# Patient Record
Sex: FEMALE | Race: WHITE | Employment: UNEMPLOYED | URBAN - METROPOLITAN AREA
[De-identification: names, ages, dates, MRNs, and addresses within clinical notes are randomized per-mention and may not be internally consistent; named-entity substitution may affect disease eponyms.]

---

## 2019-05-19 ENCOUNTER — HOSPITAL ENCOUNTER (INPATIENT)
Age: 54
LOS: 6 days | Discharge: HOME | DRG: 193 | End: 2019-05-25
Attending: EMERGENCY MEDICINE | Admitting: FAMILY MEDICINE
Payer: COMMERCIAL

## 2019-05-19 ENCOUNTER — APPOINTMENT (OUTPATIENT)
Dept: GENERAL RADIOLOGY | Age: 54
DRG: 193 | End: 2019-05-19
Attending: EMERGENCY MEDICINE
Payer: COMMERCIAL

## 2019-05-19 DIAGNOSIS — Z87.891 PERSONAL HISTORY OF TOBACCO USE, PRESENTING HAZARDS TO HEALTH: ICD-10-CM

## 2019-05-19 DIAGNOSIS — J96.01 ACUTE RESPIRATORY FAILURE WITH HYPOXIA (HCC): ICD-10-CM

## 2019-05-19 DIAGNOSIS — F41.9 ANXIETY: ICD-10-CM

## 2019-05-19 DIAGNOSIS — J16.0 PNEUMONIA OF RIGHT LOWER LOBE DUE TO CHLAMYDIA SPECIES: ICD-10-CM

## 2019-05-19 DIAGNOSIS — J44.1 COPD WITH ACUTE EXACERBATION (HCC): ICD-10-CM

## 2019-05-19 DIAGNOSIS — J18.9 PNEUMONIA OF RIGHT LOWER LOBE DUE TO INFECTIOUS ORGANISM: ICD-10-CM

## 2019-05-19 DIAGNOSIS — J44.1 COPD EXACERBATION (HCC): ICD-10-CM

## 2019-05-19 DIAGNOSIS — F17.210 CIGARETTE NICOTINE DEPENDENCE WITHOUT COMPLICATION: Chronic | ICD-10-CM

## 2019-05-19 DIAGNOSIS — J18.9 COMMUNITY ACQUIRED PNEUMONIA OF RIGHT LOWER LOBE OF LUNG: Primary | ICD-10-CM

## 2019-05-19 PROBLEM — L40.50 PSORIASIS WITH ARTHROPATHY (HCC): Status: ACTIVE | Noted: 2019-05-19

## 2019-05-19 PROBLEM — E03.9 HYPOTHYROID: Status: ACTIVE | Noted: 2019-05-19

## 2019-05-19 PROBLEM — J96.91 RESPIRATORY FAILURE WITH HYPOXIA (HCC): Status: ACTIVE | Noted: 2019-05-19

## 2019-05-19 PROBLEM — E66.9 OBESITY: Status: ACTIVE | Noted: 2019-05-19

## 2019-05-19 LAB
ALBUMIN SERPL-MCNC: 3.8 G/DL (ref 3.5–5)
ALBUMIN/GLOB SERPL: 0.9 {RATIO} (ref 1.2–3.5)
ALP SERPL-CCNC: 99 U/L (ref 50–136)
ALT SERPL-CCNC: 22 U/L (ref 12–65)
ANION GAP SERPL CALC-SCNC: 6 MMOL/L (ref 7–16)
AST SERPL-CCNC: 15 U/L (ref 15–37)
ATRIAL RATE: 110 BPM
BASOPHILS # BLD: 0.1 K/UL (ref 0–0.2)
BASOPHILS NFR BLD: 1 % (ref 0–2)
BILIRUB SERPL-MCNC: 0.4 MG/DL (ref 0.2–1.1)
BNP SERPL-MCNC: 13 PG/ML
BUN SERPL-MCNC: 9 MG/DL (ref 6–23)
CALCIUM SERPL-MCNC: 9 MG/DL (ref 8.3–10.4)
CALCULATED P AXIS, ECG09: 87 DEGREES
CALCULATED R AXIS, ECG10: 99 DEGREES
CALCULATED T AXIS, ECG11: 86 DEGREES
CHLORIDE SERPL-SCNC: 101 MMOL/L (ref 98–107)
CO2 SERPL-SCNC: 32 MMOL/L (ref 21–32)
CREAT SERPL-MCNC: 0.59 MG/DL (ref 0.6–1)
DIAGNOSIS, 93000: NORMAL
DIFFERENTIAL METHOD BLD: ABNORMAL
EOSINOPHIL # BLD: 0.1 K/UL (ref 0–0.8)
EOSINOPHIL NFR BLD: 1 % (ref 0.5–7.8)
ERYTHROCYTE [DISTWIDTH] IN BLOOD BY AUTOMATED COUNT: 15.8 % (ref 11.9–14.6)
GLOBULIN SER CALC-MCNC: 4.2 G/DL (ref 2.3–3.5)
GLUCOSE SERPL-MCNC: 95 MG/DL (ref 65–100)
HCT VFR BLD AUTO: 45.9 % (ref 35.8–46.3)
HGB BLD-MCNC: 14.5 G/DL (ref 11.7–15.4)
IMM GRANULOCYTES # BLD AUTO: 0 K/UL (ref 0–0.5)
IMM GRANULOCYTES NFR BLD AUTO: 1 % (ref 0–5)
LACTATE BLD-SCNC: 0.87 MMOL/L (ref 0.5–1.9)
LYMPHOCYTES # BLD: 1 K/UL (ref 0.5–4.6)
LYMPHOCYTES NFR BLD: 12 % (ref 13–44)
MCH RBC QN AUTO: 30.4 PG (ref 26.1–32.9)
MCHC RBC AUTO-ENTMCNC: 31.6 G/DL (ref 31.4–35)
MCV RBC AUTO: 96.2 FL (ref 79.6–97.8)
MONOCYTES # BLD: 0.7 K/UL (ref 0.1–1.3)
MONOCYTES NFR BLD: 8 % (ref 4–12)
NEUTS SEG # BLD: 6.7 K/UL (ref 1.7–8.2)
NEUTS SEG NFR BLD: 78 % (ref 43–78)
NRBC # BLD: 0 K/UL (ref 0–0.2)
P-R INTERVAL, ECG05: 116 MS
PLATELET # BLD AUTO: 232 K/UL (ref 150–450)
PMV BLD AUTO: 9.5 FL (ref 9.4–12.3)
POTASSIUM SERPL-SCNC: 4.1 MMOL/L (ref 3.5–5.1)
PROT SERPL-MCNC: 8 G/DL (ref 6.3–8.2)
Q-T INTERVAL, ECG07: 320 MS
QRS DURATION, ECG06: 76 MS
QTC CALCULATION (BEZET), ECG08: 433 MS
RBC # BLD AUTO: 4.77 M/UL (ref 4.05–5.2)
SODIUM SERPL-SCNC: 139 MMOL/L (ref 136–145)
VENTRICULAR RATE, ECG03: 110 BPM
WBC # BLD AUTO: 8.6 K/UL (ref 4.3–11.1)

## 2019-05-19 PROCEDURE — 74011250637 HC RX REV CODE- 250/637: Performed by: INTERNAL MEDICINE

## 2019-05-19 PROCEDURE — 93005 ELECTROCARDIOGRAM TRACING: CPT | Performed by: EMERGENCY MEDICINE

## 2019-05-19 PROCEDURE — 83880 ASSAY OF NATRIURETIC PEPTIDE: CPT

## 2019-05-19 PROCEDURE — 83605 ASSAY OF LACTIC ACID: CPT

## 2019-05-19 PROCEDURE — 99285 EMERGENCY DEPT VISIT HI MDM: CPT | Performed by: EMERGENCY MEDICINE

## 2019-05-19 PROCEDURE — 94640 AIRWAY INHALATION TREATMENT: CPT

## 2019-05-19 PROCEDURE — 85025 COMPLETE CBC W/AUTO DIFF WBC: CPT

## 2019-05-19 PROCEDURE — 74011000250 HC RX REV CODE- 250: Performed by: EMERGENCY MEDICINE

## 2019-05-19 PROCEDURE — 74011000258 HC RX REV CODE- 258: Performed by: FAMILY MEDICINE

## 2019-05-19 PROCEDURE — 96365 THER/PROPH/DIAG IV INF INIT: CPT | Performed by: EMERGENCY MEDICINE

## 2019-05-19 PROCEDURE — 71045 X-RAY EXAM CHEST 1 VIEW: CPT

## 2019-05-19 PROCEDURE — 77030013140 HC MSK NEB VYRM -A

## 2019-05-19 PROCEDURE — 80053 COMPREHEN METABOLIC PANEL: CPT

## 2019-05-19 PROCEDURE — 87040 BLOOD CULTURE FOR BACTERIA: CPT

## 2019-05-19 PROCEDURE — 65660000000 HC RM CCU STEPDOWN

## 2019-05-19 PROCEDURE — 94760 N-INVAS EAR/PLS OXIMETRY 1: CPT

## 2019-05-19 PROCEDURE — 77010033678 HC OXYGEN DAILY

## 2019-05-19 PROCEDURE — 74011250636 HC RX REV CODE- 250/636: Performed by: EMERGENCY MEDICINE

## 2019-05-19 PROCEDURE — 74011250636 HC RX REV CODE- 250/636: Performed by: FAMILY MEDICINE

## 2019-05-19 PROCEDURE — 74011000250 HC RX REV CODE- 250: Performed by: FAMILY MEDICINE

## 2019-05-19 PROCEDURE — 74011250637 HC RX REV CODE- 250/637: Performed by: EMERGENCY MEDICINE

## 2019-05-19 PROCEDURE — 74011250637 HC RX REV CODE- 250/637: Performed by: FAMILY MEDICINE

## 2019-05-19 RX ORDER — SODIUM CHLORIDE 0.9 % (FLUSH) 0.9 %
5-40 SYRINGE (ML) INJECTION EVERY 8 HOURS
Status: DISCONTINUED | OUTPATIENT
Start: 2019-05-19 | End: 2019-05-25 | Stop reason: HOSPADM

## 2019-05-19 RX ORDER — NALOXONE HYDROCHLORIDE 0.4 MG/ML
0.4 INJECTION, SOLUTION INTRAMUSCULAR; INTRAVENOUS; SUBCUTANEOUS AS NEEDED
Status: DISCONTINUED | OUTPATIENT
Start: 2019-05-19 | End: 2019-05-25 | Stop reason: HOSPADM

## 2019-05-19 RX ORDER — IPRATROPIUM BROMIDE 0.5 MG/2.5ML
0.5 SOLUTION RESPIRATORY (INHALATION)
Status: DISCONTINUED | OUTPATIENT
Start: 2019-05-19 | End: 2019-05-21

## 2019-05-19 RX ORDER — BUDESONIDE 0.5 MG/2ML
500 INHALANT ORAL
Status: DISCONTINUED | OUTPATIENT
Start: 2019-05-19 | End: 2019-05-25 | Stop reason: HOSPADM

## 2019-05-19 RX ORDER — HYOSCYAMINE SULFATE 0.12 MG/1
0.25 TABLET SUBLINGUAL
Status: COMPLETED | OUTPATIENT
Start: 2019-05-19 | End: 2019-05-19

## 2019-05-19 RX ORDER — IBUPROFEN 200 MG
1 TABLET ORAL EVERY 24 HOURS
Status: DISCONTINUED | OUTPATIENT
Start: 2019-05-19 | End: 2019-05-21

## 2019-05-19 RX ORDER — ONDANSETRON 2 MG/ML
4 INJECTION INTRAMUSCULAR; INTRAVENOUS
Status: DISCONTINUED | OUTPATIENT
Start: 2019-05-19 | End: 2019-05-19

## 2019-05-19 RX ORDER — SODIUM CHLORIDE 0.9 % (FLUSH) 0.9 %
5-40 SYRINGE (ML) INJECTION AS NEEDED
Status: DISCONTINUED | OUTPATIENT
Start: 2019-05-19 | End: 2019-05-25 | Stop reason: HOSPADM

## 2019-05-19 RX ORDER — ALBUTEROL SULFATE 0.83 MG/ML
10 SOLUTION RESPIRATORY (INHALATION)
Status: COMPLETED | OUTPATIENT
Start: 2019-05-19 | End: 2019-05-19

## 2019-05-19 RX ORDER — LEVALBUTEROL INHALATION SOLUTION 0.63 MG/3ML
0.63 SOLUTION RESPIRATORY (INHALATION)
Status: DISCONTINUED | OUTPATIENT
Start: 2019-05-19 | End: 2019-05-21

## 2019-05-19 RX ORDER — LEVALBUTEROL INHALATION SOLUTION 0.63 MG/3ML
0.63 SOLUTION RESPIRATORY (INHALATION)
Status: DISCONTINUED | OUTPATIENT
Start: 2019-05-19 | End: 2019-05-19 | Stop reason: CLARIF

## 2019-05-19 RX ORDER — ACETAMINOPHEN 325 MG/1
650 TABLET ORAL
Status: DISCONTINUED | OUTPATIENT
Start: 2019-05-19 | End: 2019-05-25 | Stop reason: HOSPADM

## 2019-05-19 RX ORDER — MORPHINE SULFATE 2 MG/ML
1 INJECTION, SOLUTION INTRAMUSCULAR; INTRAVENOUS
Status: DISCONTINUED | OUTPATIENT
Start: 2019-05-19 | End: 2019-05-20

## 2019-05-19 RX ORDER — LORAZEPAM 0.5 MG/1
0.5 TABLET ORAL ONCE
Status: COMPLETED | OUTPATIENT
Start: 2019-05-19 | End: 2019-05-19

## 2019-05-19 RX ORDER — DIPHENHYDRAMINE HYDROCHLORIDE 50 MG/ML
12.5 INJECTION, SOLUTION INTRAMUSCULAR; INTRAVENOUS
Status: DISCONTINUED | OUTPATIENT
Start: 2019-05-19 | End: 2019-05-20

## 2019-05-19 RX ORDER — ONDANSETRON 2 MG/ML
4 INJECTION INTRAMUSCULAR; INTRAVENOUS
Status: DISCONTINUED | OUTPATIENT
Start: 2019-05-19 | End: 2019-05-25 | Stop reason: HOSPADM

## 2019-05-19 RX ORDER — MAGNESIUM SULFATE HEPTAHYDRATE 40 MG/ML
2 INJECTION, SOLUTION INTRAVENOUS
Status: COMPLETED | OUTPATIENT
Start: 2019-05-19 | End: 2019-05-19

## 2019-05-19 RX ORDER — BUDESONIDE 0.5 MG/2ML
500 INHALANT ORAL
Status: DISCONTINUED | OUTPATIENT
Start: 2019-05-19 | End: 2019-05-19 | Stop reason: SDUPTHER

## 2019-05-19 RX ORDER — ALBUTEROL SULFATE 0.83 MG/ML
2.5 SOLUTION RESPIRATORY (INHALATION)
Status: DISCONTINUED | OUTPATIENT
Start: 2019-05-19 | End: 2019-05-19 | Stop reason: ALTCHOICE

## 2019-05-19 RX ORDER — HYDROCODONE BITARTRATE AND ACETAMINOPHEN 5; 325 MG/1; MG/1
1 TABLET ORAL
Status: DISCONTINUED | OUTPATIENT
Start: 2019-05-19 | End: 2019-05-20

## 2019-05-19 RX ORDER — HEPARIN SODIUM 5000 [USP'U]/ML
5000 INJECTION, SOLUTION INTRAVENOUS; SUBCUTANEOUS EVERY 8 HOURS
Status: DISCONTINUED | OUTPATIENT
Start: 2019-05-19 | End: 2019-05-20

## 2019-05-19 RX ORDER — LEVOFLOXACIN 5 MG/ML
750 INJECTION, SOLUTION INTRAVENOUS
Status: DISCONTINUED | OUTPATIENT
Start: 2019-05-19 | End: 2019-05-19

## 2019-05-19 RX ADMIN — LEVALBUTEROL HYDROCHLORIDE 0.63 MG: 0.63 SOLUTION RESPIRATORY (INHALATION) at 23:06

## 2019-05-19 RX ADMIN — MAGNESIUM SULFATE HEPTAHYDRATE 2 G: 40 INJECTION, SOLUTION INTRAVENOUS at 15:49

## 2019-05-19 RX ADMIN — HYOSCYAMINE SULFATE 0.25 MG: 0.12 TABLET ORAL at 16:25

## 2019-05-19 RX ADMIN — CEFTRIAXONE SODIUM 1 G: 1 INJECTION, POWDER, FOR SOLUTION INTRAMUSCULAR; INTRAVENOUS at 18:19

## 2019-05-19 RX ADMIN — SODIUM CHLORIDE 1000 ML: 900 INJECTION, SOLUTION INTRAVENOUS at 15:49

## 2019-05-19 RX ADMIN — BUDESONIDE 500 MCG: 0.5 INHALANT RESPIRATORY (INHALATION) at 19:38

## 2019-05-19 RX ADMIN — IPRATROPIUM BROMIDE 0.5 MG: 0.5 SOLUTION RESPIRATORY (INHALATION) at 19:38

## 2019-05-19 RX ADMIN — IPRATROPIUM BROMIDE 0.5 MG: 0.5 SOLUTION RESPIRATORY (INHALATION) at 17:07

## 2019-05-19 RX ADMIN — LEVALBUTEROL HYDROCHLORIDE 0.63 MG: 0.63 SOLUTION RESPIRATORY (INHALATION) at 19:38

## 2019-05-19 RX ADMIN — Medication 10 ML: at 18:21

## 2019-05-19 RX ADMIN — ALBUTEROL SULFATE 10 MG: 2.5 SOLUTION RESPIRATORY (INHALATION) at 15:51

## 2019-05-19 RX ADMIN — BUDESONIDE 500 MCG: 0.5 INHALANT RESPIRATORY (INHALATION) at 17:07

## 2019-05-19 RX ADMIN — LORAZEPAM 0.5 MG: 0.5 TABLET ORAL at 22:39

## 2019-05-19 RX ADMIN — AZITHROMYCIN MONOHYDRATE 500 MG: 500 INJECTION, POWDER, LYOPHILIZED, FOR SOLUTION INTRAVENOUS at 21:12

## 2019-05-19 RX ADMIN — METHYLPREDNISOLONE SODIUM SUCCINATE 40 MG: 40 INJECTION, POWDER, FOR SOLUTION INTRAMUSCULAR; INTRAVENOUS at 18:20

## 2019-05-19 RX ADMIN — HEPARIN SODIUM 5000 UNITS: 5000 INJECTION INTRAVENOUS; SUBCUTANEOUS at 22:38

## 2019-05-19 RX ADMIN — IPRATROPIUM BROMIDE 0.5 MG: 0.5 SOLUTION RESPIRATORY (INHALATION) at 23:06

## 2019-05-19 RX ADMIN — Medication 10 ML: at 22:43

## 2019-05-19 NOTE — ED PROVIDER NOTES
26-year-old patient with history of COPD who uses Symbicort twice a day and periodic DuoNeb at home presents with a 4-5 day history of dyspnea and cough. Patient having increased cough productive of dark phlegm, but no fevers or chills. Patient unfortunately continues to smoke cigarettes. In general, patient may use 2 vials of her DuoNeb solution a day and takes a few puffs of the nebulizer since it down to completed later. ,  Thus stretching those 2 vials out throughout the course of the day. Return to urgent care today arrival pulse ox was 84% and after 2 breathing treatments and a shot of Kenalog 46 as for 86% at which point they sent her by private vehicle off of supplemental oxygen to the ER here. No history of CHF, no history of pneumonia           No past medical history on file. No past surgical history on file. No family history on file.     Social History     Socioeconomic History    Marital status:      Spouse name: Not on file    Number of children: Not on file    Years of education: Not on file    Highest education level: Not on file   Occupational History    Not on file   Social Needs    Financial resource strain: Not on file    Food insecurity:     Worry: Not on file     Inability: Not on file    Transportation needs:     Medical: Not on file     Non-medical: Not on file   Tobacco Use    Smoking status: Not on file   Substance and Sexual Activity    Alcohol use: Not on file    Drug use: Not on file    Sexual activity: Not on file   Lifestyle    Physical activity:     Days per week: Not on file     Minutes per session: Not on file    Stress: Not on file   Relationships    Social connections:     Talks on phone: Not on file     Gets together: Not on file     Attends Rastafari service: Not on file     Active member of club or organization: Not on file     Attends meetings of clubs or organizations: Not on file     Relationship status: Not on file    Intimate partner violence:     Fear of current or ex partner: Not on file     Emotionally abused: Not on file     Physically abused: Not on file     Forced sexual activity: Not on file   Other Topics Concern    Not on file   Social History Narrative    Not on file         ALLERGIES: Patient has no known allergies. Review of Systems   Constitutional: Negative for chills and fever. HENT: Negative for rhinorrhea and sore throat. Eyes: Negative for discharge and redness. Respiratory: Positive for cough and shortness of breath. Cardiovascular: Negative for chest pain, palpitations and leg swelling. Gastrointestinal: Negative for abdominal pain, nausea and vomiting. Musculoskeletal: Negative for arthralgias and back pain. Skin: Negative for rash. Neurological: Negative for dizziness and headaches. All other systems reviewed and are negative. Vitals:    05/19/19 1551 05/19/19 1554 05/19/19 1601 05/19/19 1621   BP:   (!) 153/93    Pulse:       Resp:       Temp:       SpO2: 91% 90% 97% 97%   Weight:       Height:                Physical Exam   Constitutional: She is oriented to person, place, and time. She appears well-developed and well-nourished. She appears ill. No distress. HENT:   Head: Normocephalic and atraumatic. Eyes: Pupils are equal, round, and reactive to light. Conjunctivae are normal. Right eye exhibits no discharge. Left eye exhibits no discharge. No scleral icterus. Neck: Normal range of motion. Neck supple. Cardiovascular: Normal rate, regular rhythm and normal heart sounds. Exam reveals no gallop. No murmur heard. Pulmonary/Chest: Effort normal. No respiratory distress. She has decreased breath sounds. She has wheezes. She has no rales. Abdominal: Soft. Bowel sounds are normal. There is no tenderness. There is no guarding. Musculoskeletal: Normal range of motion. She exhibits no edema. Neurological: She is alert and oriented to person, place, and time.  She exhibits normal muscle tone. cni 2-12 grossly   Skin: Skin is warm and dry. She is not diaphoretic. Psychiatric: She has a normal mood and affect. Her behavior is normal.   Nursing note and vitals reviewed. MDM  Number of Diagnoses or Management Options  Community acquired pneumonia of right lower lobe of lung ([address]):   COPD exacerbation [facility]):   Diagnosis management comments: Medical decision making note:  Dyspnea cough, wheezing, blood work looks good, chest x-ray here shows a faint right lower lobe infiltrate  On initial examination here room air sats dropped to 89% within just 4 or 5 minutes of coming off of nasal cannula oxygen. After 2 mg continuous neb and giving the Kenalog shot from the urgent care a bit longer to kick in, sat still drop fairly rapidly. She still wheezing a good bit despite the two duonebs at the urgent care as well as 10 mg here. Admit to Hospital for pneumonia and copd Exacerbation  This concludes the \"medical decision making note\" part of this emergency department visit note.            Procedures

## 2019-05-19 NOTE — ED TRIAGE NOTES
Pt arrives from [address] [place] [address] with cough for since Thursday. Pt is a smoker and has COPD ans was 86% on RA there. Given 2 duo neb treatment that pushed her up to 92% per paperwork and was given kenalog IM at  as well. Did not come with a copy of the chest xray that she had done at . Pt states she is coughing up dark sputum as well.

## 2019-05-19 NOTE — ED NOTES
TRANSFER - OUT REPORT: 
 
Verbal report given on Lucy Severe  being transferred to 808(unit) for routine progression of care Report consisted of patients Situation, Background, Assessment and  
Recommendations(SBAR). Information from the following report(s) SBAR, ED Summary, [facility] and Recent Results was reviewed with the receiving nurse. Lines:  
Peripheral IV 05/19/19 Left Antecubital (Active) Site Assessment Clean, dry, & intact 5/19/2019  2:23 PM  
Phlebitis Assessment 0 5/19/2019  2:23 PM  
Infiltration Assessment 0 5/19/2019  2:23 PM  
Dressing Status Clean, dry, & intact 5/19/2019  2:23 PM  
Hub Color/Line Status Pink 5/19/2019  2:23 PM  
   
Peripheral IV 05/19/19 Left Hand (Active) Site Assessment Clean, dry, & intact 5/19/2019  3:14 PM  
Phlebitis Assessment 0 5/19/2019  3:14 PM  
Infiltration Assessment 0 5/19/2019  3:14 PM  
Dressing Status Clean, dry, & intact 5/19/2019  3:14 PM  
Dressing Type Transparent 5/19/2019  3:14 PM  
Hub Color/Line Status Pink 5/19/2019  3:14 PM  
  
 
Opportunity for questions and clarification was provided. Patient transported with: 
 O2 @ 2 liters

## 2019-05-19 NOTE — H&P
Hospitalist H&P Note Admit Date:  2019  2:42 PM  
Name:  [facility] Age:  53 y.o. 
:  1965 MRN:  [id number] PCP:  Delfina Gilman DO Treatment Team: Attending Provider: Magaly Corbett MD; Primary Nurse: Rona Castillo RN Cough,sob, hypoxia HPI:  
48yr old female pt with known h/o hypothyroid,psoriasis both skin and arthropathy, copd, nicotine dependent. Pt had been having cough - dry- later productive - going on since Thursday. Had chill for 1st 2 days - none after that. Noticed shortness of breath- got worse- has been taking Symbicort inhaler twice a day and duonebs twice /day-- was seen at [address] [place] [address] today found to be hypoxic 84-86%- was given 2 breathing treatments and kenalog 40 mg. In er pt got continuous breathing treatment for hr- was initially taken off oxygen but as her oxygen saturation went down to 88%- was put back. Pt otherwise doesn't c/o headache or dizziness or chest pain or dizziness or abdominal pain. Labs normal  
cxr- atelectasis vs early pneumonia Pt tachycardic- prob secondary to continuous breathing treatment in er- heart rate 115-120/min- sinus tachycardia 
ekg- sinus tachycardia- normal qt- no acute st-t wave changes. Pt will be admitted for Moderate copd exa, acute hypoxic resp failure and early pna. 
 
10 systems reviewed and negative except as noted in HPI. past medical history-  hypothyroid,psoriasis both skin and arthropathy, copd, nicotine dependent. past surgical history - cholecystectomy No Known Allergies Social History Tobacco Use  Smoking status: Nicotine dependent Substance Use Topics  Alcohol use: Nil  
  
 family history - htn There is no immunization history for the selected administration types on file for this patient. PTA Medications: 
Pt on methotrexate, folic acid, synthroid, proair,symbicort and duneb. Objective:  
 
Patient Vitals for the past 24 hrs: 
 Temp Pulse Resp BP SpO2 05/19/19 1655     92 % 05/19/19 1621     97 % 05/19/19 1601    (!) 153/93 97 % 05/19/19 1554     90 % 05/19/19 1551     91 % 05/19/19 1421 98.4 °F (36.9 °C) (!) 111 24 (!) 151/98 (!) 87 % Oxygen Therapy O2 Sat (%): 92 % (05/19/19 1655) Pulse via Oximetry: 122 beats per minute (05/19/19 1655) O2 Device: Nasal cannula (05/19/19 1655) O2 Flow Rate (L/min): 5 l/min (05/19/19 1655) No intake or output data in the 24 hours ending 05/19/19 1658 Physical Exam: 
General:    Well nourished. Alert.midl to moderate respiratory distress- says feels better after continuous neb treatment ine r- on oxygen nasal cannula 2 lit/min Eyes:   Normal sclera. Extraocular movements intact. ENT:  Normocephalic, atraumatic. Moist mucous membranes CV:   Tachycardia- 115 to 120/min  No murmur, rub, or gallop. Lungs:  Bilateral wheezing, coarse breath sounds Abdomen: Soft, nontender, nondistended. Bowel sounds normal. obese Extremities: Warm and dry. No cyanosis or edema. Neurologic: CN II-XII grossly intact. Sensation intact. Skin:     No rashes or jaundice. Psych:  Normal mood and affect. I reviewed the labs, imaging, EKGs, telemetry, and other studies done this admission. Data Review:  
Recent Results (from the past 24 hour(s)) EKG, 12 LEAD, INITIAL Collection Time: 05/19/19  2:23 PM  
Result Value Ref Range Ventricular Rate 110 BPM  
 Atrial Rate 110 BPM  
 P-R Interval 116 ms  
 QRS Duration 76 ms  
 Q-T Interval 320 ms QTC Calculation (Bezet) 433 ms Calculated P Axis 87 degrees Calculated R Axis 99 degrees Calculated T Axis 86 degrees Diagnosis Sinus tachycardia Rightward axis Borderline ECG No previous ECGs available Confirmed by Jayjay Gan ([id number]) on 5/19/2019 3:30:44 PM 
  
CBC WITH AUTOMATED DIFF Collection Time: 05/19/19  2:24 PM  
Result Value Ref Range WBC 8.6 4.3 - 11.1 K/uL  
 RBC 4.77 4.05 - 5.2 M/uL HGB 14.5 11.7 - 15.4 g/dL HCT 45.9 35.8 - 46.3 % MCV 96.2 79.6 - 97.8 FL  
 MCH 30.4 26.1 - 32.9 PG  
 MCHC 31.6 31.4 - 35.0 g/dL  
 RDW 15.8 (H) 11.9 - 14.6 % PLATELET 392 229 - 435 K/uL MPV 9.5 9.4 - 12.3 FL ABSOLUTE NRBC 0.00 0.0 - 0.2 K/uL  
 DF AUTOMATED NEUTROPHILS 78 43 - 78 % LYMPHOCYTES 12 (L) 13 - 44 % MONOCYTES 8 4.0 - 12.0 % EOSINOPHILS 1 0.5 - 7.8 % BASOPHILS 1 0.0 - 2.0 % IMMATURE GRANULOCYTES 1 0.0 - 5.0 %  
 ABS. NEUTROPHILS 6.7 1.7 - 8.2 K/UL  
 ABS. LYMPHOCYTES 1.0 0.5 - 4.6 K/UL  
 ABS. MONOCYTES 0.7 0.1 - 1.3 K/UL  
 ABS. EOSINOPHILS 0.1 0.0 - 0.8 K/UL  
 ABS. BASOPHILS 0.1 0.0 - 0.2 K/UL  
 ABS. IMM. GRANS. 0.0 0.0 - 0.5 K/UL METABOLIC PANEL, COMPREHENSIVE Collection Time: 05/19/19  2:24 PM  
Result Value Ref Range Sodium 139 136 - 145 mmol/L Potassium 4.1 3.5 - 5.1 mmol/L Chloride 101 98 - 107 mmol/L  
 CO2 32 21 - 32 mmol/L Anion gap 6 (L) 7 - 16 mmol/L Glucose 95 65 - 100 mg/dL BUN 9 6 - 23 MG/DL Creatinine 0.59 (L) 0.6 - 1.0 MG/DL  
 GFR est AA >60 >60 ml/min/1.73m2 GFR est non-AA >60 >60 ml/min/1.73m2 Calcium 9.0 8.3 - 10.4 MG/DL Bilirubin, total 0.4 0.2 - 1.1 MG/DL  
 ALT (SGPT) 22 12 - 65 U/L  
 AST (SGOT) 15 15 - 37 U/L Alk. phosphatase 99 50 - 136 U/L Protein, total 8.0 6.3 - 8.2 g/dL Albumin 3.8 3.5 - 5.0 g/dL Globulin 4.2 (H) 2.3 - 3.5 g/dL A-G Ratio 0.9 (L) 1.2 - 3.5 BNP Collection Time: 05/19/19  2:24 PM  
Result Value Ref Range BNP 13 (H) 0 pg/mL POC LACTIC ACID Collection Time: 05/19/19  2:27 PM  
Result Value Ref Range Lactic Acid (POC) 0.87 0.5 - 1.9 mmol/L All Micro Results Procedure Component Value Units Date/Time CULTURE, RESPIRATORY/SPUTUM/BRONCH Bora Alpers STAIN [[id number]] Order Status:  Sent Specimen:  Sputum CULTURE, BLOOD [[id number]] Collected:  05/19/19 1515 Order Status:  Completed Specimen:  Blood Updated:  05/19/19 1514 CULTURE, BLOOD [[id number]] Collected:  05/19/19 1421 Order Status:  Completed Specimen:  Blood Updated:  05/19/19 1508 Other Studies: Xr Chest [facility] Result Date: 5/19/2019 EXAM: Single view chest radiograph. INDICATION: 54 years cough dyspnea COMPARISON: None. FINDINGS: Minimal patchy right basilar airspace opacity favored atelectasis however difficult to exclude early pneumonia. No pneumothorax. No pleural effusion. The heart, mediastinum, anival, and pulmonary vasculature are within normal limits. No evidence of acute osseous abnormality. IMPRESSION: 1. Minimal patchy right basilar airspace opacity favored atelectasis however difficult to exclude early pneumonia. Assessment and Plan:  
 
Hospital Problems as of 5/19/2019 Never Reviewed Codes Class Noted - Resolved POA Hypothyroid ICD-10-CM: E03.9 ICD-9-CM: 244.9  5/19/2019 - Present Unknown COPD with acute exacerbation ([facility] ICD-10-CM: J44.1 ICD-9-CM: 491.21  5/19/2019 - Present Unknown Respiratory failure with hypoxia [facility]) ICD-10-CM: J96.91 
ICD-9-CM: 518.81  5/19/2019 - Present Unknown PNA (pneumonia) ICD-10-CM: J18.9 ICD-9-CM: 877  5/19/2019 - Present Unknown Psoriasis with arthropathy ([facility] ICD-10-CM: L40.50 ICD-9-CM: 696.0  5/19/2019 - Present Unknown Obesity ICD-10-CM: E66.9 ICD-9-CM: 278.00  5/19/2019 - Present Unknown Dependence on nicotine from cigarettes ICD-10-CM: F17.210 ICD-9-CM: 305.1  5/19/2019 - Present Unknown PLAN: 
Acute hypoxic resp failure- on 2 lit/min Moderate copd exa- steroids,xopenex,Atrovent and Pulmicort. PNA- cont Levaquin 500 mg q daily Psoriasis- on methotrexate Hypothyroid Obesity Nicotine dependent- advised cessation- will start on Nicotine patch 21 mg. DVT ppx:  Heparin Anticipated DC needs:   
Code status:  Full Estimated LOS:  Greater than 2 midnights Risk:  high Signed: 
Yael Chan MD

## 2019-05-19 NOTE — PROGRESS NOTES
Patient received to room 808 via bed and transport  Admission assessment completed  Alert and oriented x4  Respirations even and unlabored on 2L nasal cannula  O2 sat 91%  No signs of distress  Dual skin assessment performed with Carolee FARIAS  No skin breakdown noted  Psoriasis to bilateral lower extremities  No skin issues noted  Family at bedside  Call light and belongings within reach  Safety measures in place

## 2019-05-20 LAB
ANION GAP SERPL CALC-SCNC: 10 MMOL/L (ref 7–16)
BASOPHILS # BLD: 0 K/UL (ref 0–0.2)
BASOPHILS NFR BLD: 0 % (ref 0–2)
BUN SERPL-MCNC: 10 MG/DL (ref 6–23)
CALCIUM SERPL-MCNC: 8.7 MG/DL (ref 8.3–10.4)
CHLORIDE SERPL-SCNC: 105 MMOL/L (ref 98–107)
CO2 SERPL-SCNC: 25 MMOL/L (ref 21–32)
CREAT SERPL-MCNC: 0.48 MG/DL (ref 0.6–1)
DIFFERENTIAL METHOD BLD: ABNORMAL
EOSINOPHIL # BLD: 0 K/UL (ref 0–0.8)
EOSINOPHIL NFR BLD: 0 % (ref 0.5–7.8)
ERYTHROCYTE [DISTWIDTH] IN BLOOD BY AUTOMATED COUNT: 15.9 % (ref 11.9–14.6)
GLUCOSE SERPL-MCNC: 125 MG/DL (ref 65–100)
HCT VFR BLD AUTO: 46.3 % (ref 35.8–46.3)
HGB BLD-MCNC: 14.1 G/DL (ref 11.7–15.4)
IMM GRANULOCYTES # BLD AUTO: 0 K/UL (ref 0–0.5)
IMM GRANULOCYTES NFR BLD AUTO: 0 % (ref 0–5)
LYMPHOCYTES # BLD: 0.4 K/UL (ref 0.5–4.6)
LYMPHOCYTES NFR BLD: 6 % (ref 13–44)
MCH RBC QN AUTO: 30.7 PG (ref 26.1–32.9)
MCHC RBC AUTO-ENTMCNC: 30.5 G/DL (ref 31.4–35)
MCV RBC AUTO: 100.9 FL (ref 79.6–97.8)
MONOCYTES # BLD: 0.1 K/UL (ref 0.1–1.3)
MONOCYTES NFR BLD: 2 % (ref 4–12)
NEUTS SEG # BLD: 6.7 K/UL (ref 1.7–8.2)
NEUTS SEG NFR BLD: 92 % (ref 43–78)
NRBC # BLD: 0 K/UL (ref 0–0.2)
PLATELET # BLD AUTO: 171 K/UL (ref 150–450)
PMV BLD AUTO: 10.5 FL (ref 9.4–12.3)
POTASSIUM SERPL-SCNC: 4.9 MMOL/L (ref 3.5–5.1)
RBC # BLD AUTO: 4.59 M/UL (ref 4.05–5.2)
SODIUM SERPL-SCNC: 140 MMOL/L (ref 136–145)
WBC # BLD AUTO: 7.3 K/UL (ref 4.3–11.1)

## 2019-05-20 PROCEDURE — 36415 COLL VENOUS BLD VENIPUNCTURE: CPT

## 2019-05-20 PROCEDURE — 77030021668 HC NEB PREFIL KT VYRM -A

## 2019-05-20 PROCEDURE — 94640 AIRWAY INHALATION TREATMENT: CPT

## 2019-05-20 PROCEDURE — 94760 N-INVAS EAR/PLS OXIMETRY 1: CPT

## 2019-05-20 PROCEDURE — 74011250636 HC RX REV CODE- 250/636: Performed by: FAMILY MEDICINE

## 2019-05-20 PROCEDURE — 77030020120 HC VLV RESP PEP HI -B

## 2019-05-20 PROCEDURE — 65660000000 HC RM CCU STEPDOWN

## 2019-05-20 PROCEDURE — 74011250637 HC RX REV CODE- 250/637: Performed by: INTERNAL MEDICINE

## 2019-05-20 PROCEDURE — 74011000258 HC RX REV CODE- 258: Performed by: HOSPITALIST

## 2019-05-20 PROCEDURE — 85025 COMPLETE CBC W/AUTO DIFF WBC: CPT

## 2019-05-20 PROCEDURE — 80048 BASIC METABOLIC PNL TOTAL CA: CPT

## 2019-05-20 PROCEDURE — 77010033678 HC OXYGEN DAILY

## 2019-05-20 PROCEDURE — 77030012341 HC CHMB SPCR OPTC MDI VYRM -A

## 2019-05-20 PROCEDURE — 74011250637 HC RX REV CODE- 250/637: Performed by: HOSPITALIST

## 2019-05-20 PROCEDURE — 74011250637 HC RX REV CODE- 250/637: Performed by: FAMILY MEDICINE

## 2019-05-20 PROCEDURE — 74011250636 HC RX REV CODE- 250/636: Performed by: HOSPITALIST

## 2019-05-20 PROCEDURE — 74011000250 HC RX REV CODE- 250: Performed by: FAMILY MEDICINE

## 2019-05-20 RX ORDER — ENOXAPARIN SODIUM 100 MG/ML
30 INJECTION SUBCUTANEOUS EVERY 24 HOURS
Status: DISCONTINUED | OUTPATIENT
Start: 2019-05-20 | End: 2019-05-24

## 2019-05-20 RX ORDER — IPRATROPIUM BROMIDE AND ALBUTEROL SULFATE 2.5; .5 MG/3ML; MG/3ML
3 SOLUTION RESPIRATORY (INHALATION)
COMMUNITY
End: 2022-03-04 | Stop reason: ALTCHOICE

## 2019-05-20 RX ORDER — ZOLPIDEM TARTRATE 5 MG/1
5 TABLET ORAL
Status: DISCONTINUED | OUTPATIENT
Start: 2019-05-20 | End: 2019-05-25 | Stop reason: HOSPADM

## 2019-05-20 RX ORDER — METHOTREXATE 2.5 MG/1
25 TABLET ORAL
COMMUNITY
End: 2022-03-04

## 2019-05-20 RX ORDER — AZITHROMYCIN 250 MG/1
500 TABLET, FILM COATED ORAL EVERY 24 HOURS
Status: DISCONTINUED | OUTPATIENT
Start: 2019-05-20 | End: 2019-05-25

## 2019-05-20 RX ORDER — ALBUTEROL SULFATE 90 UG/1
2 AEROSOL, METERED RESPIRATORY (INHALATION)
Status: ON HOLD | COMMUNITY
End: 2019-05-25 | Stop reason: SDUPTHER

## 2019-05-20 RX ORDER — CODEINE PHOSPHATE AND GUAIFENESIN 10; 100 MG/5ML; MG/5ML
10 SOLUTION ORAL
Status: DISCONTINUED | OUTPATIENT
Start: 2019-05-20 | End: 2019-05-25 | Stop reason: HOSPADM

## 2019-05-20 RX ORDER — FOLIC ACID 1 MG/1
1 TABLET ORAL DAILY
COMMUNITY
End: 2020-05-08

## 2019-05-20 RX ORDER — LEVOTHYROXINE SODIUM 25 UG/1
25 TABLET ORAL
COMMUNITY

## 2019-05-20 RX ORDER — BUDESONIDE AND FORMOTEROL FUMARATE DIHYDRATE 160; 4.5 UG/1; UG/1
2 AEROSOL RESPIRATORY (INHALATION) 2 TIMES DAILY
Status: ON HOLD | COMMUNITY
End: 2019-05-25 | Stop reason: SDUPTHER

## 2019-05-20 RX ADMIN — GUAIFENESIN AND CODEINE PHOSPHATE 10 ML: 10; 100 LIQUID ORAL at 14:35

## 2019-05-20 RX ADMIN — CEFTRIAXONE 2 G: 2 INJECTION, POWDER, FOR SOLUTION INTRAMUSCULAR; INTRAVENOUS at 16:54

## 2019-05-20 RX ADMIN — BUDESONIDE 500 MCG: 0.5 INHALANT RESPIRATORY (INHALATION) at 09:20

## 2019-05-20 RX ADMIN — ZOLPIDEM TARTRATE 5 MG: 5 TABLET ORAL at 23:28

## 2019-05-20 RX ADMIN — LEVALBUTEROL HYDROCHLORIDE 0.63 MG: 0.63 SOLUTION RESPIRATORY (INHALATION) at 13:04

## 2019-05-20 RX ADMIN — HEPARIN SODIUM 5000 UNITS: 5000 INJECTION INTRAVENOUS; SUBCUTANEOUS at 05:40

## 2019-05-20 RX ADMIN — METHYLPREDNISOLONE SODIUM SUCCINATE 40 MG: 40 INJECTION, POWDER, FOR SOLUTION INTRAMUSCULAR; INTRAVENOUS at 05:40

## 2019-05-20 RX ADMIN — IPRATROPIUM BROMIDE 0.5 MG: 0.5 SOLUTION RESPIRATORY (INHALATION) at 15:40

## 2019-05-20 RX ADMIN — ACETAMINOPHEN 650 MG: 325 TABLET, FILM COATED ORAL at 14:35

## 2019-05-20 RX ADMIN — LEVALBUTEROL HYDROCHLORIDE 0.63 MG: 0.63 SOLUTION RESPIRATORY (INHALATION) at 15:40

## 2019-05-20 RX ADMIN — LEVALBUTEROL HYDROCHLORIDE 0.63 MG: 0.63 SOLUTION RESPIRATORY (INHALATION) at 09:20

## 2019-05-20 RX ADMIN — ENOXAPARIN SODIUM 30 MG: 30 INJECTION SUBCUTANEOUS at 08:59

## 2019-05-20 RX ADMIN — METHYLPREDNISOLONE SODIUM SUCCINATE 60 MG: 40 INJECTION, POWDER, FOR SOLUTION INTRAMUSCULAR; INTRAVENOUS at 13:10

## 2019-05-20 RX ADMIN — Medication 20 ML: at 14:00

## 2019-05-20 RX ADMIN — IPRATROPIUM BROMIDE 0.5 MG: 0.5 SOLUTION RESPIRATORY (INHALATION) at 13:04

## 2019-05-20 RX ADMIN — LEVALBUTEROL HYDROCHLORIDE 0.63 MG: 0.63 SOLUTION RESPIRATORY (INHALATION) at 03:41

## 2019-05-20 RX ADMIN — IPRATROPIUM BROMIDE 0.5 MG: 0.5 SOLUTION RESPIRATORY (INHALATION) at 19:41

## 2019-05-20 RX ADMIN — ACETAMINOPHEN 650 MG: 325 TABLET, FILM COATED ORAL at 08:59

## 2019-05-20 RX ADMIN — METHYLPREDNISOLONE SODIUM SUCCINATE 40 MG: 40 INJECTION, POWDER, FOR SOLUTION INTRAMUSCULAR; INTRAVENOUS at 00:15

## 2019-05-20 RX ADMIN — LEVALBUTEROL HYDROCHLORIDE 0.63 MG: 0.63 SOLUTION RESPIRATORY (INHALATION) at 23:00

## 2019-05-20 RX ADMIN — METHYLPREDNISOLONE SODIUM SUCCINATE 60 MG: 40 INJECTION, POWDER, FOR SOLUTION INTRAMUSCULAR; INTRAVENOUS at 20:53

## 2019-05-20 RX ADMIN — IPRATROPIUM BROMIDE 0.5 MG: 0.5 SOLUTION RESPIRATORY (INHALATION) at 09:20

## 2019-05-20 RX ADMIN — LEVALBUTEROL HYDROCHLORIDE 0.63 MG: 0.63 SOLUTION RESPIRATORY (INHALATION) at 19:41

## 2019-05-20 RX ADMIN — AZITHROMYCIN 500 MG: 250 TABLET, FILM COATED ORAL at 16:56

## 2019-05-20 RX ADMIN — IPRATROPIUM BROMIDE 0.5 MG: 0.5 SOLUTION RESPIRATORY (INHALATION) at 23:00

## 2019-05-20 RX ADMIN — IPRATROPIUM BROMIDE 0.5 MG: 0.5 SOLUTION RESPIRATORY (INHALATION) at 03:41

## 2019-05-20 RX ADMIN — Medication 10 ML: at 20:53

## 2019-05-20 RX ADMIN — Medication 10 ML: at 05:42

## 2019-05-20 RX ADMIN — BUDESONIDE 500 MCG: 0.5 INHALANT RESPIRATORY (INHALATION) at 19:41

## 2019-05-20 NOTE — PROGRESS NOTES
Patient reports rested/slept throughout evening. Respirations regular rate & rhythm on 3L oxygen, dyspneic on exertion. Patient lying in bed, watching tv, family present @ the bedside. Bed in low & locked position. Call light and personal belongings with reach.

## 2019-05-20 NOTE — PROGRESS NOTES
Bedside SBAR shift report given to oncoming RN, April. Respirations regular rate & rhythm on 3L oxygen via nasal cannula. No s/sx of distress.

## 2019-05-20 NOTE — PROGRESS NOTES
Patient admitted for COPD exacerbation/PNA. She is independent in all ADLs at baseline. Patient uses no DME at home. She will likely discharge home with no needs. Case Management will follow for discharge planning. Care Management Interventions PCP Verified by CM: Yes Transition of Care Consult (CM Consult): Discharge Planning Discharge Durable Medical Equipment: No 
Physical Therapy Consult: No 
Occupational Therapy Consult: No 
Speech Therapy Consult: No 
Current Support Network: Own Home Confirm Follow Up Transport: Self Plan discussed with Pt/Family/Caregiver: Yes Freedom of Choice Offered: Yes Discharge Location Discharge Placement: Home

## 2019-05-20 NOTE — PROGRESS NOTES
Bedside shift received from April, RN. Patient A&O X 4. Respirations regular rate & rhythm, on 5L oxygen via nasal cannula. No s/sx of distress. Resting quietly in chair, family/visitors present. Chair in low & locked position. Call light and personal belongings with reach.

## 2019-05-20 NOTE — PROGRESS NOTES
Bedside [facility] Patient A&O X 4. Respirations regular rate & rhythm, on 3L oxygen via nasal cannula. No s/sx of distress. Resting quietly in bed. Bed in low & locked position. Call light and personal belongings with reach. Family present @ the bedside.

## 2019-05-20 NOTE — INTERDISCIPLINARY ROUNDS
Interdisciplinary team rounds were held 5/20/2019 with the following team members:Care Management, Physical Therapy, Physician and Clinical Coordinator and the patient. Plan of care discussed. See clinical pathway and/or care plan for interventions and desired outcomes.

## 2019-05-20 NOTE — PROGRESS NOTES
Hospitalist  
Admit Date:  2019  2:42 PM  
Name:  [facility] Age:  53 y.o. 
:  1965 MRN:  [id number] PCP:  Cheryle Vargas,  
 
subj 48yr old female pt with known h/o hypothyroid,psoriasis both skin and arthropathy, copd, nicotine dependent. Pt had been having cough - dry- later productive - going on since Thursday. Had chill for 1st 2 days - none after that. Noticed shortness of breath- got worse- has been taking Symbicort inhaler twice a day and duonebs twice /day-- was seen at [address] [facility] today found to be hypoxic 84-86%- was given 2 breathing treatments and kenalog 40 mg. In er pt got continuous breathing treatment for hr- was initially taken off oxygen but as her oxygen saturation went down to 88%- was put back. Pt otherwise doesn't c/o headache or dizziness or chest pain or dizziness or abdominal pain. Labs normal  
cxr- atelectasis vs early pneumonia Pt tachycardic- prob secondary to continuous breathing treatment in er- heart rate 115-120/min- sinus tachycardia 
ekg- sinus tachycardia- normal qt- no acute st-t wave changes. Today, mildly improving, still on O2 Objective:  
 
Patient Vitals for the past 24 hrs: 
 Temp Pulse Resp BP SpO2  
19 1540     91 % 19 1538 97.7 °F (36.5 °C) (!) 104 18 147/80 91 % 19 1304     90 % 19 1132 97.9 °F (36.6 °C) (!) 102 18 118/76 90 % 19 0920     93 % 19 0703 97.9 °F (36.6 °C) (!) 105 19 160/90 91 % 19 0417 97.8 °F (36.6 °C) 100 18 129/79 91 % 19 0345     91 % 19 0114  94     
19 0006 98.2 °F (36.8 °C) (!) 109 20 117/75 95 % 19 2308     91 % 19 1943 99 °F (37.2 °C) (!) 108 20 125/75 96 % 19 1938     90 % 19 1807 98.4 °F (36.9 °C) (!) 125 22 134/87 91 % 19 1718  (!) 120 21  97 % Oxygen Therapy O2 Sat (%): 91 % (19 1540) Pulse via Oximetry: 103 beats per minute (19 1540) O2 Device: Nasal cannula (05/20/19 1540) O2 Flow Rate (L/min): 3 l/min (05/20/19 1540) Intake/Output Summary (Last 24 hours) at 5/20/2019 1712 Last data filed at 5/20/2019 1132 Gross per 24 hour Intake 1810 ml Output 800 ml Net 1010 ml Physical Exam: 
General:    Well nourished. Alert.midl to moderate respiratory distress- 
CV:   Tachycardia- 115 to 120/min  No murmur, rub, or gallop. Lungs:  Bilateral wheezing, coarse breath sounds Abdomen: Soft, nontender, nondistended. Bowel sounds normal. obese Extremities: Warm and dry. No cyanosis or edema. Neurologic: grossly intact. Skin:     No rashes or jaundice. Psych:  Normal mood and affect. I reviewed the labs, imaging, EKGs, telemetry, and other studies done this admission. Data Review:  
Recent Results (from the past 24 hour(s)) METABOLIC PANEL, BASIC Collection Time: 05/20/19  5:05 AM  
Result Value Ref Range Sodium 140 136 - 145 mmol/L Potassium 4.9 3.5 - 5.1 mmol/L Chloride 105 98 - 107 mmol/L  
 CO2 25 21 - 32 mmol/L Anion gap 10 7 - 16 mmol/L Glucose 125 (H) 65 - 100 mg/dL BUN 10 6 - 23 MG/DL Creatinine 0.48 (L) 0.6 - 1.0 MG/DL  
 GFR est AA >60 >60 ml/min/1.73m2 GFR est non-AA >60 >60 ml/min/1.73m2 Calcium 8.7 8.3 - 10.4 MG/DL  
CBC WITH AUTOMATED DIFF Collection Time: 05/20/19  5:05 AM  
Result Value Ref Range WBC 7.3 4.3 - 11.1 K/uL  
 RBC 4.59 4.05 - 5.2 M/uL  
 HGB 14.1 11.7 - 15.4 g/dL HCT 46.3 35.8 - 46.3 % .9 (H) 79.6 - 97.8 FL  
 MCH 30.7 26.1 - 32.9 PG  
 MCHC 30.5 (L) 31.4 - 35.0 g/dL  
 RDW 15.9 (H) 11.9 - 14.6 % PLATELET 311 750 - 776 K/uL MPV 10.5 9.4 - 12.3 FL ABSOLUTE NRBC 0.00 0.0 - 0.2 K/uL  
 DF AUTOMATED NEUTROPHILS 92 (H) 43 - 78 % LYMPHOCYTES 6 (L) 13 - 44 % MONOCYTES 2 (L) 4.0 - 12.0 % EOSINOPHILS 0 (L) 0.5 - 7.8 % BASOPHILS 0 0.0 - 2.0 % IMMATURE GRANULOCYTES 0 0.0 - 5.0 %  
 ABS. NEUTROPHILS 6.7 1.7 - 8.2 K/UL ABS. LYMPHOCYTES 0.4 (L) 0.5 - 4.6 K/UL  
 ABS. MONOCYTES 0.1 0.1 - 1.3 K/UL  
 ABS. EOSINOPHILS 0.0 0.0 - 0.8 K/UL  
 ABS. BASOPHILS 0.0 0.0 - 0.2 K/UL  
 ABS. IMM. GRANS. 0.0 0.0 - 0.5 K/UL All Micro Results Procedure Component Value Units Date/Time CULTURE, BLOOD [[id number]] Collected:  05/19/19 1424 Order Status:  Completed Specimen:  Blood Updated:  05/20/19 2107 Special Requests: --     
  LEFT Antecubital 
  
  Culture result: NO GROWTH AFTER 17 HOURS     
 CULTURE, BLOOD [[id number]] Collected:  05/19/19 1515 Order Status:  Completed Specimen:  Blood Updated:  05/20/19 1004 Special Requests: --     
  LEFT 
HAND Culture result: NO GROWTH AFTER 15 HOURS     
 CULTURE, RESPIRATORY/SPUTUM/BRONCH Timothy Dew STAIN [[id number]] Order Status:  Sent Specimen:  Sputum Other Studies: No results found. Assessment and Plan:  
 
Hospital Problems as of 5/20/2019 Never Reviewed Codes Class Noted - Resolved POA Hypothyroid ICD-10-CM: E03.9 ICD-9-CM: 244.9  5/19/2019 - Present Unknown COPD with acute exacerbation ([facility] ICD-10-CM: J44.1 ICD-9-CM: 491.21  5/19/2019 - Present Unknown Respiratory failure with hypoxia [facility]) ICD-10-CM: J96.91 
ICD-9-CM: 518.81  5/19/2019 - Present Unknown PNA (pneumonia) ICD-10-CM: J18.9 ICD-9-CM: 791  5/19/2019 - Present Unknown Psoriasis with arthropathy ([facility] ICD-10-CM: L40.50 ICD-9-CM: 696.0  5/19/2019 - Present Unknown Obesity ICD-10-CM: E66.9 ICD-9-CM: 278.00  5/19/2019 - Present Unknown Dependence on nicotine from cigarettes ICD-10-CM: F17.210 ICD-9-CM: 305.1  5/19/2019 - Present Unknown PLAN: 
 
Cont current rx Some changes as ordered Dispo: home when off O2 and better Signed: 
Marian Spencer MD

## 2019-05-20 NOTE — PROGRESS NOTES
Pt in room alert and oriented. Dyspnea noted at rest. O2 in placed. Lung sounds are course with wheezing noted. Skin intact no issues noted. abd is soft bowel sounds are active safety measures in place will continue to monitor.

## 2019-05-20 NOTE — PROGRESS NOTES
Spiritual Care Visit, initial visit. Attempted to visit patient (x2). Staff was attending. Will visit later as have opportunity. Visit by Keyur Carbajal, Staff .  Jamal, Danilo, B.A.

## 2019-05-21 PROBLEM — J18.9 PNA (PNEUMONIA): Status: RESOLVED | Noted: 2019-05-19 | Resolved: 2019-05-21

## 2019-05-21 PROBLEM — J96.01 ACUTE RESPIRATORY FAILURE WITH HYPOXIA (HCC): Status: ACTIVE | Noted: 2019-05-19

## 2019-05-21 PROBLEM — J16.0 PNEUMONIA OF RIGHT LOWER LOBE DUE TO CHLAMYDIA SPECIES: Status: ACTIVE | Noted: 2019-05-21

## 2019-05-21 LAB
ARTERIAL PATENCY WRIST A: YES
BASE EXCESS BLD CALC-SCNC: 5 MMOL/L
BDY SITE: ABNORMAL
BODY TEMPERATURE: 98.6
CO2 BLD-SCNC: 30 MMOL/L
FLOW RATE ISTAT,IFRATE: 5 L/MIN
GAS FLOW.O2 O2 DELIVERY SYS: ABNORMAL L/MIN
GLUCOSE BLD STRIP.AUTO-MCNC: 149 MG/DL (ref 65–100)
HCO3 BLD-SCNC: 28.6 MMOL/L (ref 22–26)
PCO2 BLD: 37.6 MMHG (ref 35–45)
PH BLD: 7.49 [PH] (ref 7.35–7.45)
PO2 BLD: 76 MMHG (ref 75–100)
SAO2 % BLD: 96 % (ref 95–98)
SERVICE CMNT-IMP: ABNORMAL
SPECIMEN TYPE: ABNORMAL

## 2019-05-21 PROCEDURE — 65660000000 HC RM CCU STEPDOWN

## 2019-05-21 PROCEDURE — 74011000250 HC RX REV CODE- 250: Performed by: HOSPITALIST

## 2019-05-21 PROCEDURE — 74011250636 HC RX REV CODE- 250/636: Performed by: HOSPITALIST

## 2019-05-21 PROCEDURE — 74011250637 HC RX REV CODE- 250/637: Performed by: FAMILY MEDICINE

## 2019-05-21 PROCEDURE — 94640 AIRWAY INHALATION TREATMENT: CPT

## 2019-05-21 PROCEDURE — 74011250637 HC RX REV CODE- 250/637: Performed by: HOSPITALIST

## 2019-05-21 PROCEDURE — 74011000258 HC RX REV CODE- 258: Performed by: HOSPITALIST

## 2019-05-21 PROCEDURE — 82962 GLUCOSE BLOOD TEST: CPT

## 2019-05-21 PROCEDURE — 94760 N-INVAS EAR/PLS OXIMETRY 1: CPT

## 2019-05-21 PROCEDURE — 99223 1ST HOSP IP/OBS HIGH 75: CPT | Performed by: INTERNAL MEDICINE

## 2019-05-21 PROCEDURE — 36600 WITHDRAWAL OF ARTERIAL BLOOD: CPT

## 2019-05-21 PROCEDURE — 82803 BLOOD GASES ANY COMBINATION: CPT

## 2019-05-21 PROCEDURE — 74011250637 HC RX REV CODE- 250/637: Performed by: NURSE PRACTITIONER

## 2019-05-21 PROCEDURE — 74011000250 HC RX REV CODE- 250: Performed by: FAMILY MEDICINE

## 2019-05-21 PROCEDURE — 77010033678 HC OXYGEN DAILY

## 2019-05-21 RX ORDER — LORAZEPAM 2 MG/ML
1 INJECTION INTRAMUSCULAR ONCE
Status: COMPLETED | OUTPATIENT
Start: 2019-05-21 | End: 2019-05-21

## 2019-05-21 RX ORDER — IPRATROPIUM BROMIDE AND ALBUTEROL SULFATE 2.5; .5 MG/3ML; MG/3ML
3 SOLUTION RESPIRATORY (INHALATION)
Status: DISCONTINUED | OUTPATIENT
Start: 2019-05-21 | End: 2019-05-25 | Stop reason: HOSPADM

## 2019-05-21 RX ORDER — HYDRALAZINE HYDROCHLORIDE 20 MG/ML
10 INJECTION INTRAMUSCULAR; INTRAVENOUS
Status: DISCONTINUED | OUTPATIENT
Start: 2019-05-21 | End: 2019-05-25 | Stop reason: HOSPADM

## 2019-05-21 RX ORDER — LEVOTHYROXINE SODIUM 50 UG/1
25 TABLET ORAL
Status: DISCONTINUED | OUTPATIENT
Start: 2019-05-21 | End: 2019-05-25 | Stop reason: HOSPADM

## 2019-05-21 RX ORDER — LORAZEPAM 1 MG/1
1 TABLET ORAL
Status: DISCONTINUED | OUTPATIENT
Start: 2019-05-21 | End: 2019-05-23

## 2019-05-21 RX ORDER — CLONIDINE HYDROCHLORIDE 0.2 MG/1
0.2 TABLET ORAL
Status: DISCONTINUED | OUTPATIENT
Start: 2019-05-21 | End: 2019-05-25 | Stop reason: HOSPADM

## 2019-05-21 RX ORDER — ALPRAZOLAM 0.25 MG/1
1 TABLET ORAL
Status: DISCONTINUED | OUTPATIENT
Start: 2019-05-21 | End: 2019-05-21

## 2019-05-21 RX ORDER — IBUPROFEN 200 MG
1 TABLET ORAL DAILY
Status: DISCONTINUED | OUTPATIENT
Start: 2019-05-21 | End: 2019-05-25 | Stop reason: HOSPADM

## 2019-05-21 RX ADMIN — IPRATROPIUM BROMIDE 0.5 MG: 0.5 SOLUTION RESPIRATORY (INHALATION) at 12:05

## 2019-05-21 RX ADMIN — LEVALBUTEROL HYDROCHLORIDE 0.63 MG: 0.63 SOLUTION RESPIRATORY (INHALATION) at 15:07

## 2019-05-21 RX ADMIN — Medication 20 ML: at 06:00

## 2019-05-21 RX ADMIN — ACETAMINOPHEN 650 MG: 325 TABLET, FILM COATED ORAL at 04:20

## 2019-05-21 RX ADMIN — IPRATROPIUM BROMIDE 0.5 MG: 0.5 SOLUTION RESPIRATORY (INHALATION) at 15:07

## 2019-05-21 RX ADMIN — Medication 10 ML: at 22:00

## 2019-05-21 RX ADMIN — IPRATROPIUM BROMIDE AND ALBUTEROL SULFATE 3 ML: .5; 3 SOLUTION RESPIRATORY (INHALATION) at 19:22

## 2019-05-21 RX ADMIN — LEVOTHYROXINE SODIUM 25 MCG: 50 TABLET ORAL at 12:10

## 2019-05-21 RX ADMIN — LEVALBUTEROL HYDROCHLORIDE 0.63 MG: 0.63 SOLUTION RESPIRATORY (INHALATION) at 06:23

## 2019-05-21 RX ADMIN — Medication 20 ML: at 14:00

## 2019-05-21 RX ADMIN — METHYLPREDNISOLONE SODIUM SUCCINATE 60 MG: 40 INJECTION, POWDER, FOR SOLUTION INTRAMUSCULAR; INTRAVENOUS at 13:55

## 2019-05-21 RX ADMIN — CEFTRIAXONE 2 G: 2 INJECTION, POWDER, FOR SOLUTION INTRAMUSCULAR; INTRAVENOUS at 17:51

## 2019-05-21 RX ADMIN — IPRATROPIUM BROMIDE 0.5 MG: 0.5 SOLUTION RESPIRATORY (INHALATION) at 06:23

## 2019-05-21 RX ADMIN — AZITHROMYCIN 500 MG: 250 TABLET, FILM COATED ORAL at 17:51

## 2019-05-21 RX ADMIN — ENOXAPARIN SODIUM 30 MG: 30 INJECTION SUBCUTANEOUS at 08:36

## 2019-05-21 RX ADMIN — BUDESONIDE 500 MCG: 0.5 INHALANT RESPIRATORY (INHALATION) at 19:22

## 2019-05-21 RX ADMIN — METHYLPREDNISOLONE SODIUM SUCCINATE 60 MG: 40 INJECTION, POWDER, FOR SOLUTION INTRAMUSCULAR; INTRAVENOUS at 05:21

## 2019-05-21 RX ADMIN — ALPRAZOLAM 0.25 MG: 0.25 TABLET ORAL at 13:53

## 2019-05-21 RX ADMIN — LORAZEPAM 1 MG: 2 INJECTION, SOLUTION INTRAMUSCULAR; INTRAVENOUS at 16:00

## 2019-05-21 RX ADMIN — LORAZEPAM 1 MG: 1 TABLET ORAL at 23:01

## 2019-05-21 RX ADMIN — ACETAMINOPHEN 650 MG: 325 TABLET, FILM COATED ORAL at 12:10

## 2019-05-21 RX ADMIN — LEVALBUTEROL HYDROCHLORIDE 0.63 MG: 0.63 SOLUTION RESPIRATORY (INHALATION) at 12:05

## 2019-05-21 RX ADMIN — METHYLPREDNISOLONE SODIUM SUCCINATE 60 MG: 40 INJECTION, POWDER, FOR SOLUTION INTRAMUSCULAR; INTRAVENOUS at 22:00

## 2019-05-21 RX ADMIN — HYDRALAZINE HYDROCHLORIDE 10 MG: 20 INJECTION INTRAMUSCULAR; INTRAVENOUS at 13:54

## 2019-05-21 RX ADMIN — BUDESONIDE 500 MCG: 0.5 INHALANT RESPIRATORY (INHALATION) at 07:43

## 2019-05-21 NOTE — INTERDISCIPLINARY ROUNDS
Interdisciplinary team rounds were held 5/21/2019 with the following team members:Care Management, Physical Therapy, Physician and Clinical Coordinator and the patient. Plan of care discussed. See clinical pathway and/or care plan for interventions and desired outcomes.

## 2019-05-21 NOTE — PROGRESS NOTES
Patient received acetaminophen 650 mg via PO for c/o headache rated 3/10. Patient reports \"hangover feeling\" this morning, believed to be due to [place] medications for sleep aid. Reports she did sleep, just not long enough.

## 2019-05-21 NOTE — PROGRESS NOTES
Patient reports decrease in headache, rated 0/10. Bedside SBAR shift report given to oncoming RN, April. Respirations regular rate & rhythm, dyspnea on exertions, on 4L oxygen via nasal cannula. No s/sx of distress. Chair in low & locked position. Call light and personal belongings with reach. Nguyen Junk

## 2019-05-21 NOTE — PROGRESS NOTES
Pt having increased dyspnea and anxiety MD notified pt given Xanax 0.25mg per pt request. She stated she did not want the ordered dose of 1mg. She continues to have dyspnea and is now pacing the room. She stated it did not help. MD called. Stable at current time will continue to monitor.

## 2019-05-21 NOTE — PROGRESS NOTES
SOB has improved pt refused any more BP meds. She is stable MD is aware. Family at bedside will continue to monitor.

## 2019-05-21 NOTE — CONSULTS
CONSULT NOTE    Micah Caro    5/21/2019    Date of Admission:  5/19/2019    The patient's chart is reviewed and the patient is discussed with the staff. Subjective:     Patient is a 53 y.o.  female seen and evaluated at the request of Dr. Leander Rick for COPD exacerbation and to establish with pulmonary service. Was tachycardic with HR 120s in the ER. She presented to [facility] with 4-5 days of productive cough with dark colored sputum, chronic tobacco abuse and noted with hypoxia with room air sat 86%. Was given neb treatments and referred to the ER for further evaluation. She has known COPD and has been followed by PCP, uses Symbicort BID and Duoneb PRN. Chronic medical:  COPD, hypothyroid, psoriasis (on Methotrexate), tobacco abuse 1/2 PPD for 35+ years), obesity. She is not employed outside the home and cares for grandchildren. Was employed in [employer] for 2-3 years and had 2 pet poodles. On exam was ambulating in the room, had occasional dry cough and states is feeling some better. States has claustrophobia and anxious being in the same room. Review of Systems  A comprehensive review of systems was negative except for: Constitutional: positive for fatigue and malaise  Respiratory: positive for cough, sputum, wheezing, dyspnea on exertion or COPD  Integument/breast: positive for skin lesion(s) and psoriasis  Endocrine: positive for hypothyroidism--on supplement    Patient Active Problem List   Diagnosis Code    Hypothyroid E03.9    COPD with acute exacerbation ([address] J44.1    Respiratory failure with hypoxia ([address] J96.91    PNA (pneumonia) J18.9    Psoriasis with arthropathy ([address] L40.50    Obesity E66.9    Dependence on nicotine from cigarettes F17.210    Pneumonia of right lower lobe due to Chlamydia species J16.0       Home [employer] company none. Prior to Admission Medications   Prescriptions Last Dose Informant Patient Reported? Taking?    albuterol (PROAIR HFA) 90 mcg/actuation inhaler   Yes Yes   Sig: Take 2 Puffs by inhalation every four (4) hours as needed for Wheezing. albuterol-ipratropium (DUO-NEB) 2.5 mg-0.5 mg/3 ml nebu   Yes Yes   Sig: 3 mL by Nebulization route every six (6) hours as needed. budesonide-formoterol (SYMBICORT) 160-4.5 mcg/actuation HFAA   Yes Yes   Sig: Take 2 Puffs by inhalation two (2) times a day. folic acid (FOLVITE) 1 mg tablet   Yes Yes   Sig: Take 1 mg by mouth daily. levothyroxine (SYNTHROID) 25 mcg tablet   Yes Yes   Sig: Take 25 mcg by mouth Daily (before breakfast). methotrexate (RHEUMATREX) 2.5 mg tablet   Yes Yes   Sig: Take 25 mg by mouth Every Friday. Facility-Administered Medications: None       Past Medical History:   Diagnosis Date    [facility] 05/19/2019    Hypothyroidism 05/19/2019    Psoriasis      No past surgical history on file.   Social History     Socioeconomic History    Marital status:      Spouse name: Not on file    Number of children: Not on file    Years of education: Not on file    Highest education level: Not on file   Occupational History    Not on file   Social Needs    Financial resource strain: Not on file    Food insecurity:     Worry: Not on file     Inability: Not on file    Transportation needs:     Medical: Not on file     Non-medical: Not on file   Tobacco Use    Smoking status: Current Every Day Smoker     Packs/day: 0.50     Types: Cigarettes   Substance and Sexual Activity    Alcohol use: Not on file    Drug use: Not on file    Sexual activity: Not on file   Lifestyle    Physical activity:     Days per week: Not on file     Minutes per session: Not on file    Stress: Not on file   Relationships    Social connections:     Talks on phone: Not on file     Gets together: Not on file     Attends [employer] service: Not on file     Active member of club or organization: Not on file     Attends meetings of clubs or organizations: Not on file     Relationship status: Not on file    Intimate partner violence:     Fear of current or ex partner: Not on file     Emotionally abused: Not on file     Physically abused: Not on file     Forced sexual activity: Not on file   Other Topics Concern    Not on file   Social History Narrative    Not on file     No family history on file.   No Known Allergies    Current Facility-Administered Medications   Medication Dose Route Frequency    levothyroxine (SYNTHROID) tablet 25 mcg  25 mcg Oral 6am    nicotine (NICODERM CQ) 14 mg/24 hr patch 1 Patch  1 Patch TransDERmal DAILY    enoxaparin (LOVENOX) injection 30 mg  30 mg SubCUTAneous Q24H    azithromycin (ZITHROMAX) tablet 500 mg  500 mg Oral Q24H    methylPREDNISolone (PF) (SOLU-MEDROL) injection 60 mg  60 mg IntraVENous Q8H    cefTRIAXone (ROCEPHIN) 2 g in 0.9% sodium chloride (MBP/ADV) 50 mL  2 g IntraVENous Q24H    guaiFENesin-codeine (ROBITUSSIN AC) 100-10 mg/5 mL solution 10 mL  10 mL Oral Q4H PRN    zolpidem (AMBIEN) tablet 5 mg  5 mg Oral QHS PRN    sodium chloride (NS) flush 5-40 mL  5-40 mL IntraVENous Q8H    sodium chloride (NS) flush 5-40 mL  5-40 mL IntraVENous PRN    tuberculin injection 5 Units  5 Units IntraDERMal ONCE    acetaminophen (TYLENOL) tablet 650 mg  650 mg Oral Q4H PRN    naloxone (NARCAN) injection 0.4 mg  0.4 mg IntraVENous PRN    ondansetron (ZOFRAN) injection 4 mg  4 mg IntraVENous Q4H PRN    ipratropium (ATROVENT) 0.02 % nebulizer solution 0.5 mg  0.5 mg Nebulization Q4H RT    budesonide (PULMICORT) 500 mcg/2 ml nebulizer suspension  500 mcg Nebulization BID RT    levalbuterol (XOPENEX) nebulizer soln 0.63 mg/3 mL  0.63 mg Nebulization Q4H RT         Objective:     Vitals:    05/21/19 0342 05/21/19 0623 05/21/19 0708 05/21/19 0744   BP: 135/79  (!) 146/98    Pulse: 95  92    Resp: 20  19    Temp: 97.7 °F (36.5 °C)  97.9 °F (36.6 °C)    SpO2: 97% 90% 90% 92%   Weight:       Height: PHYSICAL EXAM     Constitutional:  the patient is obese and in no acute distress, NC 5L, sat 92%  EENMT:  Sclera clear, pupils equal, oral mucosa moist  Respiratory: few scattered crackles and expiratory wheezes  Cardiovascular:  RRR without M,G,R  Gastrointestinal: soft and non-tender; with positive bowel sounds. Musculoskeletal: warm without cyanosis. There is no lower extremity edema. Skin:  no jaundice or rashes, chronic psoriasis    Neurologic: no gross neuro deficits     Psychiatric:  alert and oriented x 3    CXR:    5/19/19:  Minimal patchy right basilar airspace opacity favored atelectasis however  difficult to exclude early pneumonia. Recent Labs     05/20/19  0505 05/19/19  1424   WBC 7.3 8.6   HGB 14.1 14.5   HCT 46.3 45.9    232     Recent Labs     05/20/19  0505 05/19/19  1424    139   K 4.9 4.1    101   * 95   CO2 25 32   BUN 10 9   CREA 0.48* 0.59*   CA 8.7 9.0   ALB  --  3.8   TBILI  --  0.4   ALT  --  22   SGOT  --  15     No results for input(s): PH, PCO2, PO2, HCO3 in the last 72 hours. No results for input(s): LCAD, LAC in the last 72 hours.     Assessment:  (Medical Decision Making)     Hospital Problems  Never Reviewed          Codes Class Noted POA    Pneumonia of right lower lobe due to Chlamydia species ICD-10-CM: J16.0  ICD-9-CM: 483.1  5/21/2019 Unknown        Hypothyroid ICD-10-CM: E03.9  ICD-9-CM: 244.9  5/19/2019 Unknown        COPD with acute exacerbation ([facility] ICD-10-CM: J44.1  ICD-9-CM: 491.21  5/19/2019 Unknown        Respiratory failure with hypoxia (HCC) ICD-10-CM: J96.91  ICD-9-CM: 518.81  5/19/2019 Unknown        PNA (pneumonia) ICD-10-CM: J18.9  ICD-9-CM: 079  5/19/2019 Unknown        Psoriasis with arthropathy ([facility] ICD-10-CM: L40.50  ICD-9-CM: 696.0  5/19/2019 Unknown        Obesity ICD-10-CM: E66.9  ICD-9-CM: 278.00  5/19/2019 Unknown        Dependence on nicotine from cigarettes ICD-10-CM: F17.210  ICD-9-CM: 305.1  5/19/2019 Unknown Plan:  (Medical Decision Making)     --Xopenex, Atrovent, Pulmicort  --Zithromax, Rocephin day 3  --Blood cultures:  No growth 2 days-pending  --Solu Medrol 60mg q8h  --Wean O2 as tolerated--was not on prior to admission. --Arrange outpatient follow up in our office with PFTs    More than 50% of the time documented was spent in face-to-face contact with the patient and in the care of the patient on the floor/unit where the patient is located. Thank you very much for this referral.  We appreciate the opportunity to participate in this patient's care. Will follow along with above stated plan. Tani Recio MD      Lungs:  Diffuse B exp wheeze. Heart:  RRR with no Murmur/Rubs/Gallops    Additional Comments:  Patient with ongoing tobacco abuse. COPD exacerbation and possible PNA. Agree with current regimen. May need O2 at discharge. Will check sats just prior to discharge. Family members with COPD, relatively young. Will check A1AT levels. Will need outpatient pulmonary follow up. I have spoken with and examined the patient. I agree with the above assessment and plan as documented.     Tani Recio MD

## 2019-05-21 NOTE — PROGRESS NOTES
Upon reassessment of sleep aid, patient lying in bed resting quietly with eyes closed/sleeping. Respirations regular rate & rhythm on 5L oxygen via nasal cannula.  Remote telemetry reports NSR @ 92 bpm.

## 2019-05-21 NOTE — PROGRESS NOTES
Pt sitting up on the side of the bed with family at the bedside. Pt is alert and oriented with no complaints at this time. Pt is on 5 L NC. Wheezing noted upon assessment. Rt at bedside with breathing tx. Pt is on remote tele sinus tach 119 bpm. Pt states she has no anxiety at this time.  Pt and family instructed to call for assistance, call light in reach

## 2019-05-21 NOTE — PROGRESS NOTES
Hospitalist     Admit Date:  2019  2:42 PM   Name:  Brenna Jiménez   Age:  53 y.o.  :  1965   MRN:  [id number]   PCP:  Peggy Mcleod,     subj     54yr old female pt with known h/o hypothyroid,psoriasis both skin and arthropathy, copd, nicotine dependent. Pt had been having cough - dry- later productive - going on since Thursday. Had chill for 1st 2 days - none after that. Noticed shortness of breath- got worse- has been taking Symbicort inhaler twice a day and duonebs twice /day-- was seen at [address] [facility] today found to be hypoxic 84-86%- was given 2 breathing treatments and kenalog 40 mg. In er pt got continuous breathing treatment for hr- was initially taken off oxygen but as her oxygen saturation went down to 88%- was put back. Pt otherwise doesn't c/o headache or dizziness or chest pain or dizziness or abdominal pain. Labs normal   cxr- atelectasis vs early pneumonia  Pt tachycardic- prob secondary to continuous breathing treatment in er- heart rate 115-120/min- sinus tachycardia  ekg- sinus tachycardia- normal qt- no acute st-t wave changes.     Today, states she feels much better, on 5-6L    Objective:     Patient Vitals for the past 24 hrs:   Temp Pulse Resp BP SpO2   19 0744     92 %   19 0708 97.9 °F (36.6 °C) 92 19 (!) 146/98 90 %   19 0623     90 %   19 0342 97.7 °F (36.5 °C) 95 20 135/79 97 %   19 0214     93 %   19 0027  92      19 2345 97.9 °F (36.6 °C) 98 18 105/70 91 %   19 2300     (!) 89 %   19 1954  (!) 106      19 1941 97.9 °F (36.6 °C) (!) 102 20 153/90 92 %   19 1540     91 %   19 1538 97.7 °F (36.5 °C) (!) 104 18 147/80 91 %   19 1304     90 %   19 1132 97.9 °F (36.6 °C) (!) 102 18 118/76 90 %     Oxygen Therapy  O2 Sat (%): 92 % (19)  Pulse via Oximetry: 91 beats per minute (19)  O2 Device: Nasal cannula (19)  O2 Flow Rate (L/min): 5 l/min (05/21/19 0744)    Intake/Output Summary (Last 24 hours) at 5/21/2019 1027  Last data filed at 5/21/2019 0629  Gross per 24 hour   Intake 1010 ml   Output    Net 1010 ml       Physical Exam:  General:    Well nourished. Alert.midl to moderate respiratory distress-  CV:   Tachycardia- 115 to 120/min  No murmur, rub, or gallop. Lungs:  Bilateral wheezing, coarse breath sounds  Abdomen: Soft, nontender, nondistended. Bowel sounds normal. obese  Extremities: Warm and dry. No cyanosis or edema. Neurologic: grossly intact. Skin:     No rashes or jaundice. Psych:  Normal mood and affect. I reviewed the labs, imaging, EKGs, telemetry, and other studies done this admission. Data Review:   No results found for this or any previous visit (from the past 24 hour(s)). All Micro Results     Procedure Component Value Units Date/Time    CULTURE, BLOOD [[id number]] Collected:  05/19/19 1424    Order Status:  Completed Specimen:  Blood Updated:  05/21/19 0940     Special Requests: --        LEFT  Antecubital       Culture result: NO GROWTH 2 DAYS       CULTURE, BLOOD [[id number]] Collected:  05/19/19 1515    Order Status:  Completed Specimen:  Blood Updated:  05/21/19 0940     Special Requests: --        LEFT  HAND       Culture result: NO GROWTH 2 DAYS       CULTURE, RESPIRATORY/SPUTUM/BRONCH Sara Cleverly [[id number]]     Order Status:  Sent Specimen:  Sputum           Other Studies:  No results found.     Assessment and Plan:     Hospital Problems as of 5/21/2019 Never Reviewed          Codes Class Noted - Resolved POA    Hypothyroid ICD-10-CM: E03.9  ICD-9-CM: 244.9  5/19/2019 - Present Unknown        COPD with acute exacerbation ([facility] ICD-10-CM: J44.1  ICD-9-CM: 491.21  5/19/2019 - Present Unknown        Respiratory failure with hypoxia (HCC) ICD-10-CM: J96.91  ICD-9-CM: 518.81  5/19/2019 - Present Unknown        PNA (pneumonia) ICD-10-CM: J18.9  ICD-9-CM: 031  5/19/2019 - Present Unknown Psoriasis with arthropathy ([facility] ICD-10-CM: L40.50  ICD-9-CM: 696.0  5/19/2019 - Present Unknown        Obesity ICD-10-CM: E66.9  ICD-9-CM: 278.00  5/19/2019 - Present Unknown        Dependence on nicotine from cigarettes ICD-10-CM: F17.210  ICD-9-CM: 305.1  5/19/2019 - Present Unknown              PLAN:    Cont current rx  Some changes as ordered  pulm consulted, will follow as OP  Dispo: home when off O2 and better    Signed:  Segun Connor MD

## 2019-05-22 PROBLEM — F17.210 DEPENDENCE ON NICOTINE FROM CIGARETTES: Chronic | Status: ACTIVE | Noted: 2019-05-19

## 2019-05-22 PROBLEM — L40.50 PSORIASIS WITH ARTHROPATHY (HCC): Chronic | Status: ACTIVE | Noted: 2019-05-19

## 2019-05-22 PROBLEM — E66.9 OBESITY: Chronic | Status: ACTIVE | Noted: 2019-05-19

## 2019-05-22 PROBLEM — J18.9 PNEUMONIA OF RIGHT LOWER LOBE DUE TO INFECTIOUS ORGANISM: Status: ACTIVE | Noted: 2019-05-21

## 2019-05-22 PROBLEM — E03.9 HYPOTHYROID: Chronic | Status: ACTIVE | Noted: 2019-05-19

## 2019-05-22 PROCEDURE — 94760 N-INVAS EAR/PLS OXIMETRY 1: CPT

## 2019-05-22 PROCEDURE — 74011250637 HC RX REV CODE- 250/637: Performed by: NURSE PRACTITIONER

## 2019-05-22 PROCEDURE — 74011000250 HC RX REV CODE- 250: Performed by: FAMILY MEDICINE

## 2019-05-22 PROCEDURE — 77010033678 HC OXYGEN DAILY

## 2019-05-22 PROCEDURE — 74011000250 HC RX REV CODE- 250: Performed by: HOSPITALIST

## 2019-05-22 PROCEDURE — 94640 AIRWAY INHALATION TREATMENT: CPT

## 2019-05-22 PROCEDURE — 74011250637 HC RX REV CODE- 250/637: Performed by: HOSPITALIST

## 2019-05-22 PROCEDURE — 74011250637 HC RX REV CODE- 250/637: Performed by: FAMILY MEDICINE

## 2019-05-22 PROCEDURE — 99232 SBSQ HOSP IP/OBS MODERATE 35: CPT | Performed by: INTERNAL MEDICINE

## 2019-05-22 PROCEDURE — 74011000258 HC RX REV CODE- 258: Performed by: HOSPITALIST

## 2019-05-22 PROCEDURE — 74011250636 HC RX REV CODE- 250/636: Performed by: HOSPITALIST

## 2019-05-22 PROCEDURE — 65270000029 HC RM PRIVATE

## 2019-05-22 RX ADMIN — Medication: at 09:12

## 2019-05-22 RX ADMIN — LORAZEPAM 1 MG: 1 TABLET ORAL at 14:01

## 2019-05-22 RX ADMIN — CEFTRIAXONE 2 G: 2 INJECTION, POWDER, FOR SOLUTION INTRAMUSCULAR; INTRAVENOUS at 17:19

## 2019-05-22 RX ADMIN — Medication 10 ML: at 13:31

## 2019-05-22 RX ADMIN — ENOXAPARIN SODIUM 30 MG: 30 INJECTION SUBCUTANEOUS at 09:12

## 2019-05-22 RX ADMIN — Medication 10 ML: at 06:08

## 2019-05-22 RX ADMIN — IPRATROPIUM BROMIDE AND ALBUTEROL SULFATE 3 ML: .5; 3 SOLUTION RESPIRATORY (INHALATION) at 19:58

## 2019-05-22 RX ADMIN — Medication 10 ML: at 22:41

## 2019-05-22 RX ADMIN — METHYLPREDNISOLONE SODIUM SUCCINATE 60 MG: 40 INJECTION, POWDER, FOR SOLUTION INTRAMUSCULAR; INTRAVENOUS at 13:30

## 2019-05-22 RX ADMIN — METHYLPREDNISOLONE SODIUM SUCCINATE 60 MG: 40 INJECTION, POWDER, FOR SOLUTION INTRAMUSCULAR; INTRAVENOUS at 22:39

## 2019-05-22 RX ADMIN — SALINE NASAL SPRAY 2 SPRAY: 1.5 SOLUTION NASAL at 09:12

## 2019-05-22 RX ADMIN — CLONIDINE HYDROCHLORIDE 0.2 MG: 0.2 TABLET ORAL at 18:07

## 2019-05-22 RX ADMIN — ACETAMINOPHEN 650 MG: 325 TABLET, FILM COATED ORAL at 06:07

## 2019-05-22 RX ADMIN — BUDESONIDE 500 MCG: 0.5 INHALANT RESPIRATORY (INHALATION) at 19:58

## 2019-05-22 RX ADMIN — METHYLPREDNISOLONE SODIUM SUCCINATE 60 MG: 40 INJECTION, POWDER, FOR SOLUTION INTRAMUSCULAR; INTRAVENOUS at 06:07

## 2019-05-22 RX ADMIN — IPRATROPIUM BROMIDE AND ALBUTEROL SULFATE 3 ML: .5; 3 SOLUTION RESPIRATORY (INHALATION) at 14:41

## 2019-05-22 RX ADMIN — LORAZEPAM 1 MG: 1 TABLET ORAL at 22:40

## 2019-05-22 RX ADMIN — IPRATROPIUM BROMIDE AND ALBUTEROL SULFATE 3 ML: .5; 3 SOLUTION RESPIRATORY (INHALATION) at 02:16

## 2019-05-22 RX ADMIN — IPRATROPIUM BROMIDE AND ALBUTEROL SULFATE 3 ML: .5; 3 SOLUTION RESPIRATORY (INHALATION) at 07:53

## 2019-05-22 RX ADMIN — BUDESONIDE 500 MCG: 0.5 INHALANT RESPIRATORY (INHALATION) at 07:53

## 2019-05-22 RX ADMIN — LEVOTHYROXINE SODIUM 25 MCG: 50 TABLET ORAL at 06:07

## 2019-05-22 RX ADMIN — AZITHROMYCIN 500 MG: 250 TABLET, FILM COATED ORAL at 17:19

## 2019-05-22 NOTE — PROGRESS NOTES
Pt sitting up in chair. Pt alert oriented times 3 at this time. Pt denies pain or distress at this time. Pt on 5  L NC At this time. Pt encouraged to call for assistance if needed call light in reach, door open will monitor.

## 2019-05-22 NOTE — PROGRESS NOTES
Pt up in chair on 5 L NC. Pt is alert and oriented with c/o of a headache. PRN tylenol 650 mg po given at this time. Pt is stable and instructed to call for assistance.  Call light in reach

## 2019-05-22 NOTE — PROGRESS NOTES
Pt sitting up in chair. Pt anxious after receiving solu-medrol. No acute distress noted at this time. Pt on 3  L NC At this time. Call light in reach, door open will monitor.

## 2019-05-22 NOTE — PROGRESS NOTES
Hospitalist     Admit Date:  2019  2:42 PM   Name:  Nanette Kamara   Age:  53 y.o.  :  1965   MRN:  [id number]   PCP:  Malachi Wells DO    subj     HPI:  48yr old female pt with known h/o hypothyroid,psoriasis both skin and arthropathy, copd, nicotine dependent. Pt had been having cough - dry- later productive - going on since Thursday. Had chill for 1st 2 days - none after that. Noticed shortness of breath- got worse- has been taking Symbicort inhaler twice a day and duonebs twice /day-- was seen at [address] [facility] today found to be hypoxic 84-86%- was given 2 breathing treatments and kenalog 40 mg. In er pt got continuous breathing treatment for hr- was initially taken off oxygen but as her oxygen saturation went down to 88%- was put back. Pt otherwise doesn't c/o headache or dizziness or chest pain or dizziness or abdominal pain. Labs normal   cxr- atelectasis vs early pneumonia  Pt tachycardic- prob secondary to continuous breathing treatment in er- heart rate 115-120/min- sinus tachycardia  ekg- sinus tachycardia- normal qt- no acute st-t wave changes.     Today, states she feels much better, down to 3-4L    Objective:     Patient Vitals for the past 24 hrs:   Temp Pulse Resp BP SpO2   19 1519 98 °F (36.7 °C) (!) 107 24 (!) 154/100 90 %   19 1441     93 %   19 1141 98.1 °F (36.7 °C) (!) 101 24 (!) 155/95 92 %   19 1020     94 %   19 0753     94 %   19 0739 97.9 °F (36.6 °C) 92 22 (!) 156/97 93 %   19 0335 97.6 °F (36.4 °C) (!) 101 24 142/85 94 %   19 0216     96 %   19 0114   22     19 2352 97.8 °F (36.6 °C) (!) 105 (!) 36 (!) 154/97 95 %   19 97.7 °F (36.5 °C) (!) 117 22 (!) 149/92 90 %   19 1922     94 %   19 1541 98 °F (36.7 °C) (!) 103 19 (!) 153/96 92 %     Oxygen Therapy  O2 Sat (%): 90 % (19 1519)  Pulse via Oximetry: 102 beats per minute (19 1441)  O2 Device: Nasal cannula (05/22/19 1441)  O2 Flow Rate (L/min): 3 l/min (05/22/19 1441)    Intake/Output Summary (Last 24 hours) at 5/22/2019 1530  Last data filed at 5/22/2019 1341  Gross per 24 hour   Intake 840 ml   Output    Net 840 ml       Physical Exam:  General:    Well nourished. Alert.midl to moderate respiratory distress-  CV:   Tachycardia- 115 to 120/min  No murmur, rub, or gallop. Lungs:  Less bilateral wheezing  Abdomen: Soft, nontender, nondistended. Bowel sounds normal. obese  Extremities: Warm and dry. No cyanosis or edema. Neurologic: grossly intact. Skin:     No rashes or jaundice. Psych:  Normal mood and affect. I reviewed the labs, imaging, EKGs, telemetry, and other studies done this admission. Data Review:   No results found for this or any previous visit (from the past 24 hour(s)). All Micro Results     Procedure Component Value Units Date/Time    CULTURE, BLOOD [[id number]] Collected:  05/19/19 1424    Order Status:  Completed Specimen:  Blood Updated:  05/22/19 1027     Special Requests: --        LEFT  Antecubital       Culture result: NO GROWTH 3 DAYS       CULTURE, BLOOD [[id number]] Collected:  05/19/19 1515    Order Status:  Completed Specimen:  Blood Updated:  05/22/19 1027     Special Requests: --        LEFT  HAND       Culture result: NO GROWTH 3 DAYS       CULTURE, RESPIRATORY/SPUTUM/BRONCH Harlen Fickle STAIN [[id number]] Collected:  05/19/19 1700    Order Status:  Canceled Specimen:  Sputum           Other Studies:  No results found.     Assessment and Plan:     Hospital Problems as of 5/22/2019 Date Reviewed: 5/22/2019          Codes Class Noted - Resolved POA    Pneumonia of right lower lobe due to infectious organism [facility]) ICD-10-CM: J18.1  ICD-9-CM: 486  5/21/2019 - Present         Hypothyroid (Chronic) ICD-10-CM: E03.9  ICD-9-CM: 244.9  5/19/2019 - Present Yes        COPD with acute exacerbation ([facility] ICD-10-CM: J44.1  ICD-9-CM: 491.21  5/19/2019 - Present Yes        Acute respiratory failure with hypoxia [facility]) ICD-10-CM: J96.01  ICD-9-CM: 518.81  5/19/2019 - Present Yes        Psoriasis with arthropathy (HCC) (Chronic) ICD-10-CM: L40.50  ICD-9-CM: 696.0  5/19/2019 - Present Yes    Overview Signed 5/22/2019  8:35 AM by [place], NP     On Methotrexate              Obesity (Chronic) ICD-10-CM: H27.0  ICD-9-CM: 278.00  5/19/2019 - Present Yes        Dependence on nicotine from cigarettes (Chronic) ICD-10-CM: G17.833  ICD-9-CM: 305.1  5/19/2019 - Present Yes        RESOLVED: PNA (pneumonia) ICD-10-CM: J18.9  ICD-9-CM: 645  5/19/2019 - 5/21/2019 Unknown              PLAN:    Cont current rx  Anxiety improved  pulm consulted, will follow as OP  Dispo: home when off O2 and better    Signed:  Segun Connor MD

## 2019-05-22 NOTE — PROGRESS NOTES
Problem: Falls - Risk of  Goal: *Absence of Falls  Description  Document [facility] Fall Risk and appropriate interventions in the flowsheet.   Outcome: Progressing Towards Goal     Problem: Pneumonia: Day 4  Goal: Off Pathway (Use only if patient is Off Pathway)  Outcome: Progressing Towards Goal  Goal: Activity/Safety  Outcome: Progressing Towards Goal  Goal: Nutrition/Diet  Outcome: Progressing Towards Goal     Problem: Chronic Obstructive Pulmonary Disease (COPD)  Goal: *Oxygen saturation during activity within specified parameters  Outcome: Progressing Towards Goal

## 2019-05-22 NOTE — PROGRESS NOTES
Pt remains up in chair. Pt on 3 L NC At this time. Face flushed. No acute distress noted at this time. Call light in reach, door open will monitor.

## 2019-05-22 NOTE — PROGRESS NOTES
[address]  Admission Date: 5/19/2019             Daily Progress Note: 5/22/2019    The patient's chart is reviewed and the patient is discussed with the staff. 53 y.o. CF evaluated at the request of Dr. Eladia Vasquez for COPD exacerbation and to establish with pulmonary service. Was tachycardic with HR 120s in the ER. She presented to [facility] with 4-5 days of productive cough with dark colored sputum, chronic tobacco abuse and noted with hypoxia with room air sat 86%. Was given neb treatments and referred to the ER for further evaluation. She has known COPD and has been followed by PCP, uses Symbicort BID and Duoneb PRN. Chronic medical:  COPD, hypothyroid, psoriasis (on Methotrexate), tobacco abuse 1/2 PPD for 35+ years), obesity. She is not employed outside the home and cares for grandchildren. Was employed in [employer] for 2-3 years and had 2 pet poodles.     On exam was ambulating in the room, had occasional dry cough and states is feeling some better. States has claustrophobia and anxious being in the same room. Subjective:     Sitting up in chair, frequent cough and having nasal and sinus congestion with slight bloody nasal secretions \"from the oxygen\". Mild shortness of breath, slept well.     Current Facility-Administered Medications   Medication Dose Route Frequency    sodium chloride (OCEAN) 0.65 % nasal squeeze bottle 2 Spray  2 Spray Both Nostrils Q2H PRN    sodium chloride-aloe vera (AYR) nasal gel   Nasal NOW    levothyroxine (SYNTHROID) tablet 25 mcg  25 mcg Oral 6am    nicotine (NICODERM CQ) 14 mg/24 hr patch 1 Patch  1 Patch TransDERmal DAILY    cloNIDine HCl (CATAPRES) tablet 0.2 mg  0.2 mg Oral BID PRN    hydrALAZINE (APRESOLINE) 20 mg/mL injection 10 mg  10 mg IntraVENous Q6H PRN    albuterol-ipratropium (DUO-NEB) 2.5 MG-0.5 MG/3 ML  3 mL Nebulization Q4H PRN    albuterol-ipratropium (DUO-NEB) 2.5 MG-0.5 MG/3 ML  3 mL Nebulization Q6H RT    LORazepam (ATIVAN) tablet 1 mg  1 mg Oral Q6H PRN    enoxaparin (LOVENOX) injection 30 mg  30 mg SubCUTAneous Q24H    azithromycin (ZITHROMAX) tablet 500 mg  500 mg Oral Q24H    methylPREDNISolone (PF) (SOLU-MEDROL) injection 60 mg  60 mg IntraVENous Q8H    cefTRIAXone (ROCEPHIN) 2 g in 0.9% sodium chloride (MBP/ADV) 50 mL  2 g IntraVENous Q24H    guaiFENesin-codeine (ROBITUSSIN AC) 100-10 mg/5 mL solution 10 mL  10 mL Oral Q4H PRN    zolpidem (AMBIEN) tablet 5 mg  5 mg Oral QHS PRN    sodium chloride (NS) flush 5-40 mL  5-40 mL IntraVENous Q8H    sodium chloride (NS) flush 5-40 mL  5-40 mL IntraVENous PRN    acetaminophen (TYLENOL) tablet 650 mg  650 mg Oral Q4H PRN    naloxone (NARCAN) injection 0.4 mg  0.4 mg IntraVENous PRN    ondansetron (ZOFRAN) injection 4 mg  4 mg IntraVENous Q4H PRN    budesonide (PULMICORT) 500 mcg/2 ml nebulizer suspension  500 mcg Nebulization BID RT       Review of Systems  Constitutional: negative for fever, chills, sweats  Cardiovascular: trace LE edema, negative for chest pain, palpitations, syncope  Gastrointestinal:  negative for dysphagia, reflux, vomiting, diarrhea, abdominal pain, or melena  Neurologic:  negative for focal weakness, numbness, headache    Objective:     Vitals:    05/22/19 0216 05/22/19 0335 05/22/19 0640 05/22/19 0739   BP:  142/85  (!) 156/97   Pulse:  (!) 101  92   Resp:  24  22   Temp:  97.6 °F (36.4 °C)  97.9 °F (36.6 °C)   SpO2: 96% 94%  93%   Weight:   178 lb 6.4 oz (80.9 kg)    Height:         Intake and Output:   05/20 1901 - 05/22 0700  In: 1560 [P.O.:1560]  Out: -   No intake/output data recorded.     Physical Exam:   Constitution:  the patient is well developed and in no acute distress  EENMT:  Sclera clear, pupils equal, oral mucosa moist, nasal congestion  Respiratory: scattered anterior and posterior crackles, no wheezing  Cardiovascular:  RRR without M,G,R  Gastrointestinal: soft and non-tender; with positive bowel sounds. Musculoskeletal: warm without cyanosis. There is trace bilateral lower extremity edema. Skin:  no jaundice or rashes, no wounds   Neurologic: no gross neuro deficits     Psychiatric:  alert and oriented x 3    CXR:   5/19/19: Minimal patchy right basilar airspace opacity favored atelectasis however  difficult to exclude early pneumonia. LAB  Recent Labs     05/21/19  1217   GLUCPOC 149*      Recent Labs     05/20/19  0505 05/19/19  1424   WBC 7.3 8.6   HGB 14.1 14.5   HCT 46.3 45.9    232     Recent Labs     05/20/19  0505 05/19/19  1424    139   K 4.9 4.1    101   CO2 25 32   * 95   BUN 10 9   CREA 0.48* 0.59*   CA 8.7 9.0   ALB  --  3.8   TBILI  --  0.4   ALT  --  22   SGOT  --  15     Recent Labs     05/21/19  1529   PHI 7.489*   PCO2I 37.6   PO2I 76   HCO3I 28.6*     No results for input(s): LCAD, LAC in the last 72 hours. Assessment:  (Medical Decision Making)     Hospital Problems  Date Reviewed: 5/22/2019          Codes Class Noted POA    Pneumonia of right lower lobe due to Chlamydia species ICD-10-CM: J16.0  ICD-9-CM: 483.1  5/21/2019 Unknown        Hypothyroid (Chronic) ICD-10-CM: E03.9  ICD-9-CM: 244.9  5/19/2019 Yes        COPD with acute exacerbation ([facility] ICD-10-CM: J44.1  ICD-9-CM: 491.21  5/19/2019 Yes        Acute respiratory failure with hypoxia ([facility] ICD-10-CM: J96.01  ICD-9-CM: 518.81  5/19/2019 Yes        Psoriasis with arthropathy ([facility] (Chronic) ICD-10-CM: L40.50  ICD-9-CM: 696.0  5/19/2019 Yes    Overview Signed 5/22/2019  8:35 AM by John Paul Vieira NP     On Methotrexate              Obesity (Chronic) ICD-10-CM: E66.9  ICD-9-CM: 278.00  5/19/2019 Yes        Dependence on nicotine from cigarettes (Chronic) ICD-10-CM: O00.133  ICD-9-CM: 305.1  5/19/2019 Yes              Plan:  (Medical Decision Making)     --Nasal congestion--add saline spray and gel for PRN use.   --Duoneb, Pulmicort  --Zithromax, Rocephin day 4  --Blood cultures:  No growth 2 days-pending  --Solu Medrol 60mg q8h  --Wean O2 as tolerated, now on 4L--was not on prior to admission. --Arrange outpatient follow up in our office with PFTs and follow up A1AT  --Nicotine patch--smoking cessation stressed    More than 50% of the time documented was spent in face-to-face contact with the patient and in the care of the patient on the floor/unit where the patient is located. Amie Dong, TARA        Lungs:  Decreased wheeze compared to yesterday. B rhonchi. Heart:  RRR with no Murmur/Rubs/Gallops    Additional Comments:    Patient down to 4L O2 today. States she feels some better. Continue current therapy. I have spoken with and examined the patient. I agree with the above assessment and plan as documented.     Santiago Smith MD

## 2019-05-23 PROCEDURE — 74011250637 HC RX REV CODE- 250/637: Performed by: NURSE PRACTITIONER

## 2019-05-23 PROCEDURE — 94640 AIRWAY INHALATION TREATMENT: CPT

## 2019-05-23 PROCEDURE — 74011250636 HC RX REV CODE- 250/636: Performed by: HOSPITALIST

## 2019-05-23 PROCEDURE — 77010033678 HC OXYGEN DAILY

## 2019-05-23 PROCEDURE — 65270000029 HC RM PRIVATE

## 2019-05-23 PROCEDURE — 74011000258 HC RX REV CODE- 258: Performed by: HOSPITALIST

## 2019-05-23 PROCEDURE — 74011250636 HC RX REV CODE- 250/636: Performed by: INTERNAL MEDICINE

## 2019-05-23 PROCEDURE — 74011250637 HC RX REV CODE- 250/637: Performed by: HOSPITALIST

## 2019-05-23 PROCEDURE — 99232 SBSQ HOSP IP/OBS MODERATE 35: CPT | Performed by: INTERNAL MEDICINE

## 2019-05-23 PROCEDURE — 74011000250 HC RX REV CODE- 250: Performed by: FAMILY MEDICINE

## 2019-05-23 PROCEDURE — 94760 N-INVAS EAR/PLS OXIMETRY 1: CPT

## 2019-05-23 PROCEDURE — 74011000250 HC RX REV CODE- 250: Performed by: HOSPITALIST

## 2019-05-23 RX ORDER — CLONIDINE HYDROCHLORIDE 0.2 MG/1
0.2 TABLET ORAL ONCE
Status: COMPLETED | OUTPATIENT
Start: 2019-05-23 | End: 2019-05-23

## 2019-05-23 RX ORDER — LORAZEPAM 0.5 MG/1
0.5 TABLET ORAL
Status: DISCONTINUED | OUTPATIENT
Start: 2019-05-23 | End: 2019-05-25 | Stop reason: HOSPADM

## 2019-05-23 RX ORDER — LISINOPRIL 20 MG/1
20 TABLET ORAL DAILY
Status: DISCONTINUED | OUTPATIENT
Start: 2019-05-23 | End: 2019-05-24

## 2019-05-23 RX ADMIN — BUDESONIDE 500 MCG: 0.5 INHALANT RESPIRATORY (INHALATION) at 08:55

## 2019-05-23 RX ADMIN — CEFTRIAXONE 2 G: 2 INJECTION, POWDER, FOR SOLUTION INTRAMUSCULAR; INTRAVENOUS at 17:17

## 2019-05-23 RX ADMIN — ENOXAPARIN SODIUM 30 MG: 30 INJECTION SUBCUTANEOUS at 07:49

## 2019-05-23 RX ADMIN — LORAZEPAM 0.5 MG: 0.5 TABLET ORAL at 23:03

## 2019-05-23 RX ADMIN — IPRATROPIUM BROMIDE AND ALBUTEROL SULFATE 3 ML: .5; 3 SOLUTION RESPIRATORY (INHALATION) at 15:16

## 2019-05-23 RX ADMIN — Medication 10 ML: at 12:13

## 2019-05-23 RX ADMIN — LORAZEPAM 0.5 MG: 0.5 TABLET ORAL at 15:41

## 2019-05-23 RX ADMIN — METHYLPREDNISOLONE SODIUM SUCCINATE 40 MG: 40 INJECTION, POWDER, FOR SOLUTION INTRAMUSCULAR; INTRAVENOUS at 17:16

## 2019-05-23 RX ADMIN — Medication 10 ML: at 06:25

## 2019-05-23 RX ADMIN — METHYLPREDNISOLONE SODIUM SUCCINATE 40 MG: 40 INJECTION, POWDER, FOR SOLUTION INTRAMUSCULAR; INTRAVENOUS at 12:13

## 2019-05-23 RX ADMIN — LISINOPRIL 20 MG: 20 TABLET ORAL at 10:04

## 2019-05-23 RX ADMIN — CLONIDINE HYDROCHLORIDE 0.2 MG: 0.2 TABLET ORAL at 17:16

## 2019-05-23 RX ADMIN — IPRATROPIUM BROMIDE AND ALBUTEROL SULFATE 3 ML: .5; 3 SOLUTION RESPIRATORY (INHALATION) at 01:05

## 2019-05-23 RX ADMIN — Medication 5 ML: at 17:17

## 2019-05-23 RX ADMIN — BUDESONIDE 500 MCG: 0.5 INHALANT RESPIRATORY (INHALATION) at 19:43

## 2019-05-23 RX ADMIN — METHYLPREDNISOLONE SODIUM SUCCINATE 60 MG: 40 INJECTION, POWDER, FOR SOLUTION INTRAMUSCULAR; INTRAVENOUS at 06:22

## 2019-05-23 RX ADMIN — GUAIFENESIN AND CODEINE PHOSPHATE 10 ML: 10; 100 LIQUID ORAL at 23:02

## 2019-05-23 RX ADMIN — CLONIDINE HYDROCHLORIDE 0.2 MG: 0.2 TABLET ORAL at 07:50

## 2019-05-23 RX ADMIN — IPRATROPIUM BROMIDE AND ALBUTEROL SULFATE 3 ML: .5; 3 SOLUTION RESPIRATORY (INHALATION) at 08:55

## 2019-05-23 RX ADMIN — LEVOTHYROXINE SODIUM 25 MCG: 50 TABLET ORAL at 06:24

## 2019-05-23 RX ADMIN — IPRATROPIUM BROMIDE AND ALBUTEROL SULFATE 3 ML: .5; 3 SOLUTION RESPIRATORY (INHALATION) at 19:43

## 2019-05-23 RX ADMIN — AZITHROMYCIN 500 MG: 250 TABLET, FILM COATED ORAL at 17:17

## 2019-05-23 RX ADMIN — Medication 10 ML: at 21:51

## 2019-05-23 NOTE — PROGRESS NOTES
Beside shift report received from [facility]  Patient sitting in recliner  Respirations present  O2 sat 88% on 3L nasal cannula  Patient placed on 4L nasal cannula O2 sat 90%  No signs of distress  No needs expressed at this time  Safety measures in place

## 2019-05-23 NOTE — PROGRESS NOTES
Patient's /101  Dr. Andres Razo notified  [facility] order received for one time dose of clonidine 0.2 mg

## 2019-05-23 NOTE — PROGRESS NOTES
Hospitalist     Admit Date:  2019  2:42 PM   Name:  Bianca Day   Age:  53 y.o.  :  1965   MRN:  [id number]   PCP:  Cheryl Jansen DO    subj     HPI:  48yr old female pt with known h/o hypothyroid,psoriasis both skin and arthropathy, copd, nicotine dependent. Pt had been having cough - dry- later productive - going on since Thursday. Had chill for 1st 2 days - none after that. Noticed shortness of breath- got worse- has been taking Symbicort inhaler twice a day and duonebs twice /day-- was seen at [id number] [facility] today found to be hypoxic 84-86%- was given 2 breathing treatments and kenalog 40 mg. In er pt got continuous breathing treatment for hr- was initially taken off oxygen but as her oxygen saturation went down to 88%- was put back. Pt otherwise doesn't c/o headache or dizziness or chest pain or dizziness or abdominal pain. Labs normal   cxr- atelectasis vs early pneumonia  Pt tachycardic- prob secondary to continuous breathing treatment in er- heart rate 115-120/min- sinus tachycardia  ekg- sinus tachycardia- normal qt- no acute st-t wave changes.     Today, feels much better, down to 3-4L, no significant anxiety    Objective:     Patient Vitals for the past 24 hrs:   Temp Pulse Resp BP SpO2   19 1111 97.9 °F (36.6 °C) (!) 109 20 122/86 90 %   19 1004  99  151/88    19 0858     91 %   19 0750  91  (!) 169/91    19 0703 97.6 °F (36.4 °C) (!) 102 22 (!) 173/113 90 %   19 0307 97.5 °F (36.4 °C) 90 18 136/81 90 %   19 0105     94 %   19 2305 98 °F (36.7 °C) 84 18 100/75 94 %   19 1958     95 %   19 98.6 °F (37 °C) 95 18 145/81 92 %   19 1730    (!) 165/103    19 1519 98 °F (36.7 °C) (!) 107 24 (!) 154/100 90 %   19 1441     93 %     Oxygen Therapy  O2 Sat (%): 90 % (19 1111)  Pulse via Oximetry: 99 beats per minute (19 0858)  O2 Device: Nasal cannula (19 0858)  O2 Flow Rate (L/min): 3 l/min (05/23/19 0858)  FIO2 (%): 32 % (05/23/19 0105)    Intake/Output Summary (Last 24 hours) at 5/23/2019 1426  Last data filed at 5/23/2019 1111  Gross per 24 hour   Intake 840 ml   Output 350 ml   Net 490 ml       Physical Exam:  General:    Well nourished. Alert.midl to moderate respiratory distress-  CV:   Tachycardia- 115 to 120/min  No murmur, rub, or gallop. Lungs:  Less bilateral wheezing  Abdomen: Soft, nontender, nondistended. Bowel sounds normal. obese  Extremities: Warm and dry. No cyanosis or edema. Neurologic: grossly intact. Skin:     No rashes or jaundice. Psych:  Normal mood and affect. I reviewed the labs, imaging, EKGs, telemetry, and other studies done this admission. Data Review:   No results found for this or any previous visit (from the past 24 hour(s)). All Micro Results     Procedure Component Value Units Date/Time    CULTURE, BLOOD [[id number]] Collected:  05/19/19 1424    Order Status:  Completed Specimen:  Blood Updated:  05/23/19 1025     Special Requests: --        LEFT  Antecubital       Culture result: NO GROWTH 4 DAYS       CULTURE, BLOOD [[id number]] Collected:  05/19/19 1515    Order Status:  Completed Specimen:  Blood Updated:  05/23/19 1025     Special Requests: --        LEFT  HAND       Culture result: NO GROWTH 4 DAYS       CULTURE, RESPIRATORY/SPUTUM/BRONCH Nimco Jock STAIN [[id number]] Collected:  05/19/19 1700    Order Status:  Canceled Specimen:  Sputum           Other Studies:  No results found.     Assessment and Plan:     Hospital Problems as of 5/23/2019 Date Reviewed: 5/23/2019          Codes Class Noted - Resolved POA    Pneumonia of right lower lobe due to infectious organism [facility]) ICD-10-CM: J18.1  ICD-9-CM: 486  5/21/2019 - Present Yes        Hypothyroid (Chronic) ICD-10-CM: E03.9  ICD-9-CM: 244.9  5/19/2019 - Present Yes        COPD with acute exacerbation ([facility] ICD-10-CM: J44.1  ICD-9-CM: 491.21  5/19/2019 - Present Yes Acute respiratory failure with hypoxia [facility]) ICD-10-CM: J96.01  ICD-9-CM: 518.81  5/19/2019 - Present Yes        Psoriasis with arthropathy ([facility]) (Chronic) ICD-10-CM: L40.50  ICD-9-CM: 696.0  5/19/2019 - Present Yes    Overview Signed 5/22/2019  8:35 AM by Cally Tatum NP     On Methotrexate              Obesity (Chronic) ICD-10-CM: F83.3  ICD-9-CM: 278.00  5/19/2019 - Present Yes        Dependence on nicotine from cigarettes (Chronic) ICD-10-CM: K76.279  ICD-9-CM: 305.1  5/19/2019 - Present Yes        RESOLVED: PNA (pneumonia) ICD-10-CM: J18.9  ICD-9-CM: 317  5/19/2019 - 5/21/2019 Unknown              PLAN:    Cont current rx  Titrate O2 down as possible  Anxiety improved  pulm consulted, will follow as OP  Dispo: home when off/ less O2 need and better    Signed:  Svitlana Martin MD

## 2019-05-23 NOTE — PROGRESS NOTES
[address]  Admission Date: 5/19/2019             Daily Progress Note: 5/23/2019    The patient's chart is reviewed and the patient is discussed with the staff. 53 y.o. CF evaluated at the request of Dr. Temitope Carpio for COPD exacerbation and to establish with pulmonary service. Was tachycardic with HR 120s in the ER. She presented to [facility] with 4-5 days of productive cough with dark colored sputum, chronic tobacco abuse and noted with hypoxia with room air sat 86%. Was given neb treatments and referred to the ER for further evaluation. She has known COPD and has been followed by PCP, uses Symbicort BID and Duoneb PRN. Chronic medical:  COPD, hypothyroid, psoriasis (on Methotrexate), tobacco abuse 1/2 PPD for 35+ years), obesity. She is not employed outside the home and cares for grandchildren. Was employed in [employer] for 2-3 years and had 2 pet poodles.     On exam was ambulating in the room, had occasional dry cough and states is feeling some better. States has claustrophobia and anxious being in the same room. Subjective:     Sitting up in chair, frequent dry cough and complains of feeling \"jittery and anxious\". Nasal and sinus congestion improved. O2 weaned to 3L but had to be increased back to 4L this morning.     Current Facility-Administered Medications   Medication Dose Route Frequency    sodium chloride (OCEAN) 0.65 % nasal squeeze bottle 2 Spray  2 Spray Both Nostrils Q2H PRN    levothyroxine (SYNTHROID) tablet 25 mcg  25 mcg Oral 6am    nicotine (NICODERM CQ) 14 mg/24 hr patch 1 Patch  1 Patch TransDERmal DAILY    cloNIDine HCl (CATAPRES) tablet 0.2 mg  0.2 mg Oral BID PRN    hydrALAZINE (APRESOLINE) 20 mg/mL injection 10 mg  10 mg IntraVENous Q6H PRN    albuterol-ipratropium (DUO-NEB) 2.5 MG-0.5 MG/3 ML  3 mL Nebulization Q4H PRN    albuterol-ipratropium (DUO-NEB) 2.5 MG-0.5 MG/3 ML  3 mL Nebulization Q6H RT    LORazepam (ATIVAN) tablet 1 mg  1 mg Oral Q6H PRN    enoxaparin (LOVENOX) injection 30 mg  30 mg SubCUTAneous Q24H    azithromycin (ZITHROMAX) tablet 500 mg  500 mg Oral Q24H    methylPREDNISolone (PF) (SOLU-MEDROL) injection 60 mg  60 mg IntraVENous Q8H    cefTRIAXone (ROCEPHIN) 2 g in 0.9% sodium chloride (MBP/ADV) 50 mL  2 g IntraVENous Q24H    guaiFENesin-codeine (ROBITUSSIN AC) 100-10 mg/5 mL solution 10 mL  10 mL Oral Q4H PRN    zolpidem (AMBIEN) tablet 5 mg  5 mg Oral QHS PRN    sodium chloride (NS) flush 5-40 mL  5-40 mL IntraVENous Q8H    sodium chloride (NS) flush 5-40 mL  5-40 mL IntraVENous PRN    acetaminophen (TYLENOL) tablet 650 mg  650 mg Oral Q4H PRN    naloxone (NARCAN) injection 0.4 mg  0.4 mg IntraVENous PRN    ondansetron (ZOFRAN) injection 4 mg  4 mg IntraVENous Q4H PRN    budesonide (PULMICORT) 500 mcg/2 ml nebulizer suspension  500 mcg Nebulization BID RT       Review of Systems  Constitutional: negative for fever, chills, sweats  Cardiovascular: trace LE edema, negative for chest pain, palpitations, syncope  Gastrointestinal:  negative for dysphagia, reflux, vomiting, diarrhea, abdominal pain, or melena  Neurologic:  negative for focal weakness, numbness, headache    Objective:     Vitals:    05/23/19 0105 05/23/19 0307 05/23/19 0540 05/23/19 0703   BP:  136/81  (!) 173/113   Pulse:  90  (!) 102   Resp:  18  22   Temp:  97.5 °F (36.4 °C)  97.6 °F (36.4 °C)   SpO2: 94% 90%  90%   Weight:   180 lb 4.8 oz (81.8 kg)    Height:         Intake and Output:   05/21 1901 - 05/23 0700  In: 1080 [P.O.:1080]  Out: -   No intake/output data recorded. Physical Exam:   Constitution:  the patient is well developed and in no acute distress, NC 4L sat 90%  EENMT:  Sclera clear, pupils equal, oral mucosa moist, nasal congestion  Respiratory: scattered anterior and posterior crackles and wheezing  Cardiovascular:  RRR without M,G,R  Gastrointestinal: soft and non-tender; with positive bowel sounds.   Musculoskeletal: warm without cyanosis. There is trace bilateral lower extremity edema. Skin:  no jaundice or rashes, no wounds   Neurologic: no gross neuro deficits     Psychiatric:  alert and oriented x 3    CXR:   5/19/19: Minimal patchy right basilar airspace opacity favored atelectasis however  difficult to exclude early pneumonia. LAB  Recent Labs     05/21/19  1217   GLUCPOC 149*      No results for input(s): WBC, HGB, HCT, PLT, INR, HGBEXT, HCTEXT, PLTEXT, HGBEXT, HCTEXT, PLTEXT in the last 72 hours. No lab exists for component: INREXT, INREXT  No results for input(s): NA, K, CL, CO2, GLU, BUN, CREA, MG, CA, PHOS, TROIQ, ALB, TBIL, TBILI, GPT, ALT, SGOT, BNPP in the last 72 hours. No lab exists for component: TROIP  Recent Labs     05/21/19  1529   PHI 7.489*   PCO2I 37.6   PO2I 76   HCO3I 28.6*     No results for input(s): LCAD, LAC in the last 72 hours.     Patient Active Problem List   Diagnosis Code    Hypothyroid E03.9    COPD with acute exacerbation ([facility] J44.1    Acute respiratory failure with hypoxia ([facility]) J96.01    [facility] with [facility] L40.50    Obesity E66.9    Dependence on nicotine from cigarettes F17.210    Pneumonia of right lower lobe due to infectious organism [facility]) J18.1       Assessment:  (Medical Decision Making)     Hospital Problems  Date Reviewed: 5/23/2019          Codes Class Noted POA    Pneumonia of right lower lobe due to infectious organism [facility]) ICD-10-CM: J18.1  ICD-9-CM: 436  5/21/2019 Yes        Hypothyroid (Chronic) ICD-10-CM: E03.9  ICD-9-CM: 244.9  5/19/2019 Yes        COPD with acute exacerbation ([facility] ICD-10-CM: J44.1  ICD-9-CM: 491.21  5/19/2019 Yes        Acute respiratory failure with hypoxia ([facility] ICD-10-CM: J96.01  ICD-9-CM: 518.81  5/19/2019 Yes        Psoriasis with arthropathy ([facility] (Chronic) ICD-10-CM: L40.50  ICD-9-CM: 696.0  5/19/2019 Yes    Overview Signed 5/22/2019  8:35 AM by Douglas Nazario NP     On Methotrexate              Obesity (Chronic) ICD-10-CM: E66.9  ICD-9-CM: 278.00  5/19/2019 Yes        Dependence on nicotine from cigarettes (Chronic) ICD-10-CM: N55.256  ICD-9-CM: 305.1  5/19/2019 Yes              Plan:  (Medical Decision Making)     --Duoneb, Pulmicort  --Zithromax, Rocephin day 5  --Blood cultures:  No growth 3 days-pending  --Solu Medrol 40mg a3s--xqwv to 40mg q12h in AM if better  --Remains on 4L O2--was not on prior to admission. Will need O2 at discharge. --Arrange outpatient follow up in our office with PFTs and follow up A1AT  --Nicotine patch--smoking cessation stressed  --Consider follow up CXR vs chest CT with ongoing hypoxia. More than 50% of the time documented was spent in face-to-face contact with the patient and in the care of the patient on the floor/unit where the patient is located. Pooja Dumont NP    The patient has been seen and examined by me personally, the chart,labs, and radiographic studies have been reviewed. Chest: Bilateral expiratory wheezing and tachypnea  Extremities: 1+ edema  Still in an exacerbated state- solumedrol 40 mg q 6h and deescalate in AM if better  She is likely chronically hypoxic and was severely dyspneic with exertion in the past 6 months at [facility] and will need home O2 with discharge. As of now she id GOLD stage IV COPD. I agree with the above assessment and plan otherwise. Not ready for discharge.     Bacilio Peralta MD.

## 2019-05-23 NOTE — PROGRESS NOTES
Pt sitting up in chair on 3 L NC. Pt is alert and oriented x4. Pt skin is red and face is flushed and is contributed to Iv steroid dose. Pt states feeling \"drowsy\" from ativan dose today. No acute distress noted at this time. Call light in reach.

## 2019-05-24 PROBLEM — I10 HTN (HYPERTENSION): Status: ACTIVE | Noted: 2019-05-24

## 2019-05-24 LAB
BACTERIA SPEC CULT: NORMAL
BACTERIA SPEC CULT: NORMAL
SERVICE CMNT-IMP: NORMAL
SERVICE CMNT-IMP: NORMAL

## 2019-05-24 PROCEDURE — 74011250637 HC RX REV CODE- 250/637: Performed by: HOSPITALIST

## 2019-05-24 PROCEDURE — 77010033678 HC OXYGEN DAILY

## 2019-05-24 PROCEDURE — 74011250637 HC RX REV CODE- 250/637: Performed by: NURSE PRACTITIONER

## 2019-05-24 PROCEDURE — 65270000029 HC RM PRIVATE

## 2019-05-24 PROCEDURE — 74011250636 HC RX REV CODE- 250/636: Performed by: NURSE PRACTITIONER

## 2019-05-24 PROCEDURE — 99232 SBSQ HOSP IP/OBS MODERATE 35: CPT | Performed by: INTERNAL MEDICINE

## 2019-05-24 PROCEDURE — 74011250637 HC RX REV CODE- 250/637: Performed by: INTERNAL MEDICINE

## 2019-05-24 PROCEDURE — 74011250636 HC RX REV CODE- 250/636: Performed by: HOSPITALIST

## 2019-05-24 PROCEDURE — 94640 AIRWAY INHALATION TREATMENT: CPT

## 2019-05-24 PROCEDURE — 74011000250 HC RX REV CODE- 250: Performed by: HOSPITALIST

## 2019-05-24 PROCEDURE — 74011000250 HC RX REV CODE- 250: Performed by: FAMILY MEDICINE

## 2019-05-24 PROCEDURE — 94760 N-INVAS EAR/PLS OXIMETRY 1: CPT

## 2019-05-24 PROCEDURE — 74011000258 HC RX REV CODE- 258: Performed by: HOSPITALIST

## 2019-05-24 PROCEDURE — 74011250636 HC RX REV CODE- 250/636: Performed by: FAMILY MEDICINE

## 2019-05-24 PROCEDURE — 74011250636 HC RX REV CODE- 250/636: Performed by: INTERNAL MEDICINE

## 2019-05-24 RX ORDER — LISINOPRIL 20 MG/1
40 TABLET ORAL DAILY
Status: DISCONTINUED | OUTPATIENT
Start: 2019-05-24 | End: 2019-05-25 | Stop reason: HOSPADM

## 2019-05-24 RX ORDER — HYDROCHLOROTHIAZIDE 25 MG/1
12.5 TABLET ORAL DAILY
Status: DISCONTINUED | OUTPATIENT
Start: 2019-05-24 | End: 2019-05-25 | Stop reason: HOSPADM

## 2019-05-24 RX ORDER — FUROSEMIDE 10 MG/ML
40 INJECTION INTRAMUSCULAR; INTRAVENOUS ONCE
Status: COMPLETED | OUTPATIENT
Start: 2019-05-24 | End: 2019-05-24

## 2019-05-24 RX ORDER — ENOXAPARIN SODIUM 100 MG/ML
40 INJECTION SUBCUTANEOUS EVERY 24 HOURS
Status: DISCONTINUED | OUTPATIENT
Start: 2019-05-24 | End: 2019-05-25 | Stop reason: HOSPADM

## 2019-05-24 RX ADMIN — ENOXAPARIN SODIUM 40 MG: 40 INJECTION SUBCUTANEOUS at 09:26

## 2019-05-24 RX ADMIN — BUDESONIDE 500 MCG: 0.5 INHALANT RESPIRATORY (INHALATION) at 19:29

## 2019-05-24 RX ADMIN — FUROSEMIDE 40 MG: 10 INJECTION, SOLUTION INTRAMUSCULAR; INTRAVENOUS at 09:25

## 2019-05-24 RX ADMIN — METHYLPREDNISOLONE SODIUM SUCCINATE 40 MG: 40 INJECTION, POWDER, FOR SOLUTION INTRAMUSCULAR; INTRAVENOUS at 00:23

## 2019-05-24 RX ADMIN — METHYLPREDNISOLONE SODIUM SUCCINATE 40 MG: 40 INJECTION, POWDER, FOR SOLUTION INTRAMUSCULAR; INTRAVENOUS at 11:57

## 2019-05-24 RX ADMIN — METHYLPREDNISOLONE SODIUM SUCCINATE 40 MG: 40 INJECTION, POWDER, FOR SOLUTION INTRAMUSCULAR; INTRAVENOUS at 05:31

## 2019-05-24 RX ADMIN — LORAZEPAM 0.5 MG: 0.5 TABLET ORAL at 05:30

## 2019-05-24 RX ADMIN — Medication 10 ML: at 16:52

## 2019-05-24 RX ADMIN — METHYLPREDNISOLONE SODIUM SUCCINATE 40 MG: 40 INJECTION, POWDER, FOR SOLUTION INTRAMUSCULAR; INTRAVENOUS at 16:52

## 2019-05-24 RX ADMIN — HYDROCHLOROTHIAZIDE 12.5 MG: 25 TABLET ORAL at 09:25

## 2019-05-24 RX ADMIN — IPRATROPIUM BROMIDE AND ALBUTEROL SULFATE 3 ML: .5; 3 SOLUTION RESPIRATORY (INHALATION) at 14:28

## 2019-05-24 RX ADMIN — IPRATROPIUM BROMIDE AND ALBUTEROL SULFATE 3 ML: .5; 3 SOLUTION RESPIRATORY (INHALATION) at 07:54

## 2019-05-24 RX ADMIN — Medication 10 ML: at 05:34

## 2019-05-24 RX ADMIN — HYDRALAZINE HYDROCHLORIDE 10 MG: 20 INJECTION INTRAMUSCULAR; INTRAVENOUS at 03:02

## 2019-05-24 RX ADMIN — AZITHROMYCIN 500 MG: 250 TABLET, FILM COATED ORAL at 16:52

## 2019-05-24 RX ADMIN — METHYLPREDNISOLONE SODIUM SUCCINATE 40 MG: 40 INJECTION, POWDER, FOR SOLUTION INTRAMUSCULAR; INTRAVENOUS at 23:22

## 2019-05-24 RX ADMIN — LEVOTHYROXINE SODIUM 25 MCG: 50 TABLET ORAL at 05:29

## 2019-05-24 RX ADMIN — LORAZEPAM 0.5 MG: 0.5 TABLET ORAL at 21:32

## 2019-05-24 RX ADMIN — BUDESONIDE 500 MCG: 0.5 INHALANT RESPIRATORY (INHALATION) at 07:54

## 2019-05-24 RX ADMIN — CEFTRIAXONE 2 G: 2 INJECTION, POWDER, FOR SOLUTION INTRAMUSCULAR; INTRAVENOUS at 16:52

## 2019-05-24 RX ADMIN — Medication 10 ML: at 21:33

## 2019-05-24 RX ADMIN — IPRATROPIUM BROMIDE AND ALBUTEROL SULFATE 3 ML: .5; 3 SOLUTION RESPIRATORY (INHALATION) at 19:28

## 2019-05-24 RX ADMIN — LISINOPRIL 40 MG: 20 TABLET ORAL at 09:25

## 2019-05-24 NOTE — PROGRESS NOTES
Beside shift report received from [facility]  Patient sitting up in recliner  Respirations present  No signs of distress  No needs expressed at this time  Safety measures in place

## 2019-05-24 NOTE — PROGRESS NOTES
[address]  Admission Date: 5/19/2019             Daily Progress Note: 5/24/2019    The patient's chart is reviewed and the patient is discussed with the staff. 53 y.o. CF evaluated at the request of Dr. Bryan Myers for COPD exacerbation and to establish with pulmonary service. Was tachycardic with HR 120s in the ER. She presented to [facility] with 4-5 days of productive cough with dark colored sputum, chronic tobacco abuse and noted with hypoxia with room air sat 86%. Was given neb treatments and referred to the ER for further evaluation. She has known COPD and has been followed by PCP, uses Symbicort BID and Duoneb PRN. Chronic medical:  COPD, hypothyroid, psoriasis (on Methotrexate), tobacco abuse 1/2 PPD for 35+ years), obesity. She is not employed outside the home and cares for grandchildren. Was employed in [employer] for 2-3 years and had 2 pet poodles.     On exam was ambulating in the room, had occasional dry cough and states is feeling some better. States has claustrophobia and anxious being in the same room. Subjective:     Sitting up in chair, dry cough and still \"jittery and anxious\". Short of breath with conversation. O2 weaned to 3L. Hypertensive and requiring PRN meds.       Current Facility-Administered Medications   Medication Dose Route Frequency    LORazepam (ATIVAN) tablet 0.5 mg  0.5 mg Oral Q6H PRN    lisinopril (PRINIVIL, ZESTRIL) tablet 20 mg  20 mg Oral DAILY    methylPREDNISolone (PF) (SOLU-MEDROL) injection 40 mg  40 mg IntraVENous Q6H    sodium chloride (OCEAN) 0.65 % nasal squeeze bottle 2 Spray  2 Spray Both Nostrils Q2H PRN    levothyroxine (SYNTHROID) tablet 25 mcg  25 mcg Oral 6am    nicotine (NICODERM CQ) 14 mg/24 hr patch 1 Patch  1 Patch TransDERmal DAILY    cloNIDine HCl (CATAPRES) tablet 0.2 mg  0.2 mg Oral BID PRN    hydrALAZINE (APRESOLINE) 20 mg/mL injection 10 mg  10 mg IntraVENous Q6H PRN    albuterol-ipratropium (DUO-NEB) 2.5 MG-0.5 MG/3 ML  3 mL Nebulization Q4H PRN    albuterol-ipratropium (DUO-NEB) 2.5 MG-0.5 MG/3 ML  3 mL Nebulization Q6H RT    enoxaparin (LOVENOX) injection 30 mg  30 mg SubCUTAneous Q24H    azithromycin (ZITHROMAX) tablet 500 mg  500 mg Oral Q24H    cefTRIAXone (ROCEPHIN) 2 g in 0.9% sodium chloride (MBP/ADV) 50 mL  2 g IntraVENous Q24H    guaiFENesin-codeine (ROBITUSSIN AC) 100-10 mg/5 mL solution 10 mL  10 mL Oral Q4H PRN    zolpidem (AMBIEN) tablet 5 mg  5 mg Oral QHS PRN    sodium chloride (NS) flush 5-40 mL  5-40 mL IntraVENous Q8H    sodium chloride (NS) flush 5-40 mL  5-40 mL IntraVENous PRN    acetaminophen (TYLENOL) tablet 650 mg  650 mg Oral Q4H PRN    naloxone (NARCAN) injection 0.4 mg  0.4 mg IntraVENous PRN    ondansetron (ZOFRAN) injection 4 mg  4 mg IntraVENous Q4H PRN    budesonide (PULMICORT) 500 mcg/2 ml nebulizer suspension  500 mcg Nebulization BID RT       Review of Systems  Constitutional: negative for fever, chills, sweats  Cardiovascular: trace LE edema, negative for chest pain, palpitations, syncope  Gastrointestinal:  negative for dysphagia, reflux, vomiting, diarrhea, abdominal pain, or melena  Neurologic:  negative for focal weakness, numbness, headache    Objective:     Vitals:    05/24/19 0258 05/24/19 0301 05/24/19 0334 05/24/19 0434   BP: (!) 171/92 (!) 178/94 137/83    Pulse: 86      Resp: 18      Temp: 98.2 °F (36.8 °C)      SpO2: 94%      Weight:    185 lb 4.8 oz (84.1 kg)   Height:         Intake and Output:   05/22 1901 - 05/24 0700  In: 840 [P.O.:840]  Out: 1100 [Urine:1100]  No intake/output data recorded.     Physical Exam:   Constitution:  the patient is well developed and in no acute distress, NC 3L sat 91%  EENMT:  Sclera clear, pupils equal, oral mucosa moist, nasal congestion  Respiratory: scattered anterior and posterior crackles and wheezing  Cardiovascular:  RRR without M,G,R  Gastrointestinal: soft and non-tender; with positive bowel sounds. Musculoskeletal: warm without cyanosis. There is trace bilateral lower extremity edema. Skin:  no jaundice or rashes, no wounds   Neurologic: no gross neuro deficits     Psychiatric:  alert and oriented x 3    CXR:   5/19/19: Minimal patchy right basilar airspace opacity favored atelectasis however difficult to exclude early pneumonia. LAB  Recent Labs     05/21/19  1217   GLUCPOC 149*      No results for input(s): WBC, HGB, HCT, PLT, INR, HGBEXT, HCTEXT, PLTEXT, HGBEXT, HCTEXT, PLTEXT in the last 72 hours. No lab exists for component: INREXT, INREXT  No results for input(s): NA, K, CL, CO2, GLU, BUN, CREA, MG, CA, PHOS, TROIQ, ALB, TBIL, TBILI, GPT, ALT, SGOT, BNPP in the last 72 hours. No lab exists for component: TROIP  Recent Labs     05/21/19  1529   PHI 7.489*   PCO2I 37.6   PO2I 76   HCO3I 28.6*     No results for input(s): LCAD, LAC in the last 72 hours.     Patient Active Problem List   Diagnosis Code    Hypothyroid E03.9    COPD with acute exacerbation ([facility] J44.1    Acute respiratory failure with hypoxia (HCC) J96.01    Psoriasis with arthropathy ([facility] L40.50    Obesity E66.9    Dependence on nicotine from cigarettes F17.210    Pneumonia of right lower lobe due to infectious organism ([facility] J18.1    HTN (hypertension) I10       Assessment:  (Medical Decision Making)     Hospital Problems  Date Reviewed: 5/24/2019          Codes Class Noted POA    HTN (hypertension) ICD-10-CM: I10  ICD-9-CM: 401.9  5/24/2019 Unknown        Pneumonia of right lower lobe due to infectious organism [facility]) ICD-10-CM: J18.1  ICD-9-CM: 333  5/21/2019 Yes        Hypothyroid (Chronic) ICD-10-CM: E03.9  ICD-9-CM: 244.9  5/19/2019 Yes        COPD with acute exacerbation ([facility] ICD-10-CM: J44.1  ICD-9-CM: 491.21  5/19/2019 Yes        Acute respiratory failure with hypoxia ([facility] ICD-10-CM: J96.01  ICD-9-CM: 518.81  5/19/2019 Yes        Psoriasis with arthropathy (HCC) (Chronic) ICD-10-CM: L40.50  ICD-9-CM: 696.0  5/19/2019 Yes    Overview Signed 5/22/2019  8:35 AM by Kelley Guallpa NP     On Methotrexate              Obesity (Chronic) ICD-10-CM: E66.9  ICD-9-CM: 278.00  5/19/2019 Yes        Dependence on nicotine from cigarettes (Chronic) ICD-10-CM: G39.219  ICD-9-CM: 305.1  5/19/2019 Yes              Plan:  (Medical Decision Making)     --Duoneb, Pulmicort  --Zithromax, Rocephin day 6  --Blood cultures:  No growth 4 days-pending  --Solu Medrol 40mg e3m--uhaiaixx   --Remains on 3L O2--was not on prior to admission. Will need O2 at discharge. --Arrange outpatient follow up in our office with PFTs and follow up A1AT  --Nicotine patch--smoking cessation stressed  --Lasix x1 dose now  --Requiring PRN BP meds--has no hx of HTN but now may benefit from antihypertensive. More than 50% of the time documented was spent in face-to-face contact with the patient and in the care of the patient on the floor/unit where the patient is located. Tracy Mccain NP    The patient has been seen and examined by me personally, the chart,labs, and radiographic studies have been reviewed. Chest: Bilateral end expiratory wheezing -improved over yesterday  Extremities: 1+ edema      I agree with the above assessment and plan.     Margie Murphy MD.

## 2019-05-24 NOTE — PROGRESS NOTES
Hospitalist     Admit Date:  2019  2:42 PM   Name:  Frankey Mori   Age:  53 y.o.  :  1965   MRN:  [id number]   PCP:  Paolo Narayan,     subj     HPI:  48yr old female pt with known h/o hypothyroid,psoriasis both skin and arthropathy, copd, nicotine dependent. Pt had been having cough - dry- later productive - going on since Thursday. Had chill for 1st 2 days - none after that. Noticed shortness of breath- got worse- has been taking Symbicort inhaler twice a day and duonebs twice /day-- was seen at [address] [facility] today found to be hypoxic 84-86%- was given 2 breathing treatments and kenalog 40 mg. In er pt got continuous breathing treatment for hr- was initially taken off oxygen but as her oxygen saturation went down to 88%- was put back. Pt otherwise doesn't c/o headache or dizziness or chest pain or dizziness or abdominal pain. Labs normal   cxr- atelectasis vs early pneumonia  Pt tachycardic- prob secondary to continuous breathing treatment in er- heart rate 115-120/min- sinus tachycardia  ekg- sinus tachycardia- normal qt- no acute st-t wave changes.     Today, feels much better, still on 3-4L, no significant anxiety    Objective:     Patient Vitals for the past 24 hrs:   Temp Pulse Resp BP SpO2   19 1058 98.1 °F (36.7 °C) (!) 108 18 123/77 90 %   19 0755     93 %   19 0706 98.2 °F (36.8 °C) 95 19 (!) 147/98 91 %   19 0334    137/83    19 0301    (!) 178/94    19 0258 98.2 °F (36.8 °C) 86 18 (!) 171/92 94 %   19 2305 98.6 °F (37 °C) 98 18 128/81 92 %   19 1943     94 %   19 1918 98 °F (36.7 °C) 96 18 161/78 91 %   19 1827    123/78 94 %   19 1532 98 °F (36.7 °C) (!) 101 20 (!) 153/101 91 %   19 1516     93 %     Oxygen Therapy  O2 Sat (%): 90 % (19 1058)  Pulse via Oximetry: 71 beats per minute (19 0755)  O2 Device: Nasal cannula (19 0755)  O2 Flow Rate (L/min): 4 l/min (05/24/19 0755)  FIO2 (%): 32 % (05/23/19 0105)    Intake/Output Summary (Last 24 hours) at 5/24/2019 1335  Last data filed at 5/23/2019 1758  Gross per 24 hour   Intake 240 ml   Output 300 ml   Net -60 ml       Physical Exam:  General:    Well nourished. Alert.midl to moderate respiratory distress-  CV:   Tachycardia- 115 to 120/min  No murmur, rub, or gallop. Lungs:  mild bilateral wheezing  Abdomen: Soft, nontender, nondistended. Bowel sounds normal. obese  Extremities: Warm and dry. No cyanosis or edema. Neurologic: grossly intact. Skin:     No rashes or jaundice. Psych:  Normal mood and affect. I reviewed the labs, imaging, EKGs, telemetry, and other studies done this admission. Data Review:   No results found for this or any previous visit (from the past 24 hour(s)). All Micro Results     Procedure Component Value Units Date/Time    CULTURE, BLOOD [[id number]] Collected:  05/19/19 1515    Order Status:  Completed Specimen:  Blood Updated:  05/24/19 0941     Special Requests: --        LEFT  HAND       Culture result: NO GROWTH 5 DAYS       CULTURE, BLOOD [[id number]] Collected:  05/19/19 1424    Order Status:  Completed Specimen:  Blood Updated:  05/24/19 0941     Special Requests: --        LEFT  Antecubital       Culture result: NO GROWTH 5 DAYS       CULTURE, RESPIRATORY/SPUTUM/BRONCH Murlean Jaison [[id number]] Collected:  05/19/19 1700    Order Status:  Canceled Specimen:  Sputum           Other Studies:  No results found.     Assessment and Plan:     Hospital Problems as of 5/24/2019 Date Reviewed: 5/24/2019          Codes Class Noted - Resolved POA    HTN (hypertension) ICD-10-CM: I10  ICD-9-CM: 401.9  5/24/2019 - Present Unknown        Pneumonia of right lower lobe due to infectious organism [facility]) ICD-10-CM: J18.1  ICD-9-CM: 486  5/21/2019 - Present Yes        Hypothyroid (Chronic) ICD-10-CM: E03.9  ICD-9-CM: 244.9  5/19/2019 - Present Yes        COPD with acute exacerbation ([facility] ICD-10-CM: J44.1  ICD-9-CM: 491.21  5/19/2019 - Present Yes        Acute respiratory failure with hypoxia [facility]) ICD-10-CM: J96.01  ICD-9-CM: 518.81  5/19/2019 - Present Yes        Psoriasis with arthropathy (HCC) (Chronic) ICD-10-CM: L40.50  ICD-9-CM: 696.0  5/19/2019 - Present Yes    Overview Signed 5/22/2019  8:35 AM by Devonte Maria, NP     On Methotrexate              Obesity (Chronic) ICD-10-CM: E66.9  ICD-9-CM: 278.00  5/19/2019 - Present Yes        Dependence on nicotine from cigarettes (Chronic) ICD-10-CM: H08.252  ICD-9-CM: 305.1  5/19/2019 - Present Yes        RESOLVED: PNA (pneumonia) ICD-10-CM: J18.9  ICD-9-CM: 841  5/19/2019 - 5/21/2019 Unknown              PLAN:    Cont current rx  Titrate O2 down as possible  Anxiety improved  pulm consulted, will follow as OP  Dispo: home when at minimal need of O2 need and when better    Signed:  Geraldo Mckinney MD

## 2019-05-24 NOTE — PROGRESS NOTES
Patient resting in bed alert and oriented, cooperative with care, no visual S/S of pain or distress, breathing even and unlabored, safety measures in place, call light within reach.

## 2019-05-24 NOTE — PROGRESS NOTES
Patient resting in bed alert and oriented, cooperative with care, denies pain or distress, call light within reach.

## 2019-05-25 VITALS
RESPIRATION RATE: 18 BRPM | SYSTOLIC BLOOD PRESSURE: 136 MMHG | TEMPERATURE: 98 F | WEIGHT: 185.3 LBS | HEART RATE: 104 BPM | BODY MASS INDEX: 32.83 KG/M2 | DIASTOLIC BLOOD PRESSURE: 68 MMHG | OXYGEN SATURATION: 91 % | HEIGHT: 63 IN

## 2019-05-25 PROCEDURE — 74011250637 HC RX REV CODE- 250/637: Performed by: INTERNAL MEDICINE

## 2019-05-25 PROCEDURE — 94760 N-INVAS EAR/PLS OXIMETRY 1: CPT

## 2019-05-25 PROCEDURE — 74011250637 HC RX REV CODE- 250/637: Performed by: HOSPITALIST

## 2019-05-25 PROCEDURE — 74011250636 HC RX REV CODE- 250/636: Performed by: FAMILY MEDICINE

## 2019-05-25 PROCEDURE — 99232 SBSQ HOSP IP/OBS MODERATE 35: CPT | Performed by: INTERNAL MEDICINE

## 2019-05-25 PROCEDURE — 74011250637 HC RX REV CODE- 250/637: Performed by: NURSE PRACTITIONER

## 2019-05-25 PROCEDURE — 94640 AIRWAY INHALATION TREATMENT: CPT

## 2019-05-25 PROCEDURE — 74011000250 HC RX REV CODE- 250: Performed by: HOSPITALIST

## 2019-05-25 PROCEDURE — 74011250636 HC RX REV CODE- 250/636: Performed by: INTERNAL MEDICINE

## 2019-05-25 PROCEDURE — 77010033678 HC OXYGEN DAILY

## 2019-05-25 PROCEDURE — 74011000250 HC RX REV CODE- 250: Performed by: FAMILY MEDICINE

## 2019-05-25 RX ORDER — FLUCONAZOLE 100 MG/1
200 TABLET ORAL
Status: COMPLETED | OUTPATIENT
Start: 2019-05-25 | End: 2019-05-25

## 2019-05-25 RX ORDER — BUDESONIDE AND FORMOTEROL FUMARATE DIHYDRATE 160; 4.5 UG/1; UG/1
2 AEROSOL RESPIRATORY (INHALATION) 2 TIMES DAILY
Qty: 3 INHALER | Refills: 0 | Status: SHIPPED | OUTPATIENT
Start: 2019-05-25 | End: 2019-06-14 | Stop reason: SDUPTHER

## 2019-05-25 RX ORDER — ALBUTEROL SULFATE 90 UG/1
2 AEROSOL, METERED RESPIRATORY (INHALATION)
Qty: 6 INHALER | Refills: 0 | Status: SHIPPED | OUTPATIENT
Start: 2019-05-25 | End: 2020-05-08

## 2019-05-25 RX ORDER — FLUCONAZOLE 100 MG/1
100 TABLET ORAL DAILY
Qty: 5 TAB | Refills: 0 | Status: SHIPPED | OUTPATIENT
Start: 2019-05-26 | End: 2019-05-31

## 2019-05-25 RX ORDER — LORAZEPAM 0.5 MG/1
0.5 TABLET ORAL
Qty: 20 TAB | Refills: 0 | Status: SHIPPED | OUTPATIENT
Start: 2019-05-25 | End: 2022-03-04

## 2019-05-25 RX ORDER — CEFUROXIME AXETIL 500 MG/1
500 TABLET ORAL 2 TIMES DAILY
Qty: 10 TAB | Refills: 0 | Status: SHIPPED | OUTPATIENT
Start: 2019-05-25 | End: 2019-06-14

## 2019-05-25 RX ORDER — AZITHROMYCIN 250 MG/1
250 TABLET, FILM COATED ORAL DAILY
Qty: 5 TAB | Refills: 0 | Status: SHIPPED | OUTPATIENT
Start: 2019-05-25 | End: 2019-06-14

## 2019-05-25 RX ORDER — PREDNISONE 20 MG/1
20 TABLET ORAL
Qty: 5 TAB | Refills: 0 | Status: SHIPPED | OUTPATIENT
Start: 2019-05-25 | End: 2019-06-14

## 2019-05-25 RX ORDER — HYDROCHLOROTHIAZIDE 12.5 MG/1
25 TABLET ORAL DAILY
Qty: 90 TAB | Refills: 0 | Status: SHIPPED | OUTPATIENT
Start: 2019-05-25 | End: 2020-05-08

## 2019-05-25 RX ORDER — LISINOPRIL 40 MG/1
40 TABLET ORAL DAILY
Qty: 90 TAB | Refills: 1 | Status: SHIPPED | OUTPATIENT
Start: 2019-05-25 | End: 2020-05-08

## 2019-05-25 RX ORDER — FLUCONAZOLE 100 MG/1
100 TABLET ORAL DAILY
Status: DISCONTINUED | OUTPATIENT
Start: 2019-05-26 | End: 2019-05-25 | Stop reason: HOSPADM

## 2019-05-25 RX ADMIN — Medication 10 ML: at 05:45

## 2019-05-25 RX ADMIN — FLUCONAZOLE 200 MG: 100 TABLET ORAL at 12:16

## 2019-05-25 RX ADMIN — BUDESONIDE 500 MCG: 0.5 INHALANT RESPIRATORY (INHALATION) at 07:25

## 2019-05-25 RX ADMIN — METHYLPREDNISOLONE SODIUM SUCCINATE 40 MG: 40 INJECTION, POWDER, FOR SOLUTION INTRAMUSCULAR; INTRAVENOUS at 05:44

## 2019-05-25 RX ADMIN — IPRATROPIUM BROMIDE AND ALBUTEROL SULFATE 3 ML: .5; 3 SOLUTION RESPIRATORY (INHALATION) at 07:25

## 2019-05-25 RX ADMIN — LISINOPRIL 40 MG: 20 TABLET ORAL at 10:35

## 2019-05-25 RX ADMIN — IPRATROPIUM BROMIDE AND ALBUTEROL SULFATE 3 ML: .5; 3 SOLUTION RESPIRATORY (INHALATION) at 01:05

## 2019-05-25 RX ADMIN — LEVOTHYROXINE SODIUM 25 MCG: 50 TABLET ORAL at 05:41

## 2019-05-25 RX ADMIN — ENOXAPARIN SODIUM 40 MG: 40 INJECTION SUBCUTANEOUS at 10:34

## 2019-05-25 RX ADMIN — METHYLPREDNISOLONE SODIUM SUCCINATE 40 MG: 40 INJECTION, POWDER, FOR SOLUTION INTRAMUSCULAR; INTRAVENOUS at 13:54

## 2019-05-25 RX ADMIN — HYDROCHLOROTHIAZIDE 12.5 MG: 25 TABLET ORAL at 10:34

## 2019-05-25 RX ADMIN — Medication 10 ML: at 13:55

## 2019-05-25 NOTE — PROGRESS NOTES
Bedside report received from night nurse Raphael Cisneros. Assessment done as noted  Respiration even and unlabored 20/min; denies pain or nausea at present. Encouraged to call with needs.

## 2019-05-25 NOTE — PROGRESS NOTES
05/25/19 0726   Oxygen Therapy   O2 Sat (%) 92 %   Pulse via Oximetry 116 beats per minute   O2 Device Nasal cannula   O2 Flow Rate (L/min) 2 l/min  (weaned from 3L)

## 2019-05-25 NOTE — DISCHARGE SUMMARY
Physician Discharge Summary     Patient ID:  Frankey Mori  [id number]  25 y.o.  1965    Admit date: 5/19/2019    Discharge date: 5-    Diagnosis:  1- Community acquired pneumonia of RLL, improved on Cephalosporins and Azithromycin. Blood cultures negative. 2- COPD exacerbation, improved with steroids and bronchodilators. Seen and to follow with Pulmonology. 3- Acute hypoxic respiratory failure, requiired 2L oxygen on discharge, arranged by . 4- Tobacco use, counseled to stop  5- Panic attacks, treated transiently with ativan as needed. Consider SSRI. 6- Hypertension, new diagnosis, treatment started. Hospital course:   53 y.o.  female seen and evaluated at the request of Dr. Augie Simmons for COPD exacerbation and to establish with pulmonary service. Was tachycardic with HR 120s in the ER. She presented to [facility] with 4-5 days of productive cough with dark colored sputum, chronic tobacco abuse and noted with hypoxia with room air sat 86%. Was given neb treatments and referred to the ER for further evaluation. She has known COPD and has been followed by PCP, uses Symbicort BID and Duoneb PRN. Chronic medical:  COPD, hypothyroid, psoriasis (on Methotrexate), tobacco abuse 1/2 PPD for 35+ years), obesity. She is not employed outside the home and cares for grandchildren. Was employed in [employer] for 2-3 years and had 2 pet poodles. On exam was ambulating in the room, had occasional dry cough and states is feeling some better. States has claustrophobia and anxious being in the same room. Patient admitted and treated as above. On day of discharge, patient exam showed minimal exp wheezing and pt felt better. PCP: Paolo Narayan DO    Patient Instructions:   Current Discharge Medication List      START taking these medications    Details   azithromycin (ZITHROMAX) 250 mg tablet Take 1 Tab by mouth daily.   Qty: 5 Tab, Refills: 0      cefUROXime (CEFTIN) 500 mg tablet Take 1 Tab by mouth two (2) times a day. Qty: 10 Tab, Refills: 0      lisinopril (PRINIVIL, ZESTRIL) 40 mg tablet Take 1 Tab by mouth daily. Qty: 90 Tab, Refills: 1      predniSONE (DELTASONE) 20 mg tablet Take 20 mg by mouth daily (with breakfast). Qty: 5 Tab, Refills: 0      hydroCHLOROthiazide (HYDRODIURIL) 12.5 mg tablet Take 2 Tabs by mouth daily. Qty: 90 Tab, Refills: 0      LORazepam (ATIVAN) 0.5 mg tablet Take 1 Tab by mouth every eight (8) hours as needed for Anxiety. Max Daily Amount: 1.5 mg.  Qty: 20 Tab, Refills: 0    Associated Diagnoses: Anxiety         CONTINUE these medications which have CHANGED    Details   albuterol (PROAIR HFA) 90 mcg/actuation inhaler Take 2 Puffs by inhalation every four (4) hours as needed for Wheezing. Qty: 6 Inhaler, Refills: 0      budesonide-formoterol (SYMBICORT) 160-4.5 mcg/actuation HFAA Take 2 Puffs by inhalation two (2) times a day. Qty: 3 Inhaler, Refills: 0         CONTINUE these medications which have NOT CHANGED    Details   levothyroxine (SYNTHROID) 25 mcg tablet Take 25 mcg by mouth Daily (before breakfast). folic acid (FOLVITE) 1 mg tablet Take 1 mg by mouth daily. albuterol-ipratropium (DUO-NEB) 2.5 mg-0.5 mg/3 ml nebu 3 mL by Nebulization route every six (6) hours as needed. methotrexate (RHEUMATREX) 2.5 mg tablet Take 25 mg by mouth Every Friday. Instructions:     No Tobacco, Pulmonology in 1-2 weeks, Monitor BP at home w/ goal 120/80, Oxygen all time as recommended by RT evaluation today    Diet:  Low salt, low fat    Activity: As tolerated. Condition: Stable    Follow-up with primary physician in 2-4 week. Time 35 min    Please send copy to primary physician.     Signed:  Carmen Ladd MD  5/25/2019  10:37 AM

## 2019-05-25 NOTE — PROGRESS NOTES
Patient d/c home today with no needs for supportive care orders received for CM at this time. CM will continue to monitor and remain available for any concerns or needs that may occur. Care Management Interventions  PCP Verified by CM:  Yes  Transition of Care Consult (CM Consult): Discharge Planning  Discharge Durable Medical Equipment: No  Physical Therapy Consult: No  Occupational Therapy Consult: No  Speech Therapy Consult: No  Current Support Network: Own Home  Confirm Follow Up Transport: Self  Plan discussed with Pt/Family/Caregiver: Yes  Freedom of Choice Offered: Yes  Discharge Location  Discharge Placement: Home

## 2019-05-25 NOTE — PROGRESS NOTES
Oxygen Qualifier       Room air: SpO2 with O2 and liter flow   Resting SpO2  83%  85% on 1L   88% on 2L   93% on 3L     Ambulating SpO2  NA 82% on 1L  85% on 2L  90% on 3L       Completed by:    Melida Carmona, RT

## 2019-05-25 NOTE — PROGRESS NOTES
[address]  Admission Date: 5/19/2019             Daily Progress Note: 5/25/2019    The patient's chart is reviewed and the patient is discussed with the staff. 53 y.o. CF evaluated at the request of Dr. Al Parker for COPD exacerbation and to establish with pulmonary service. Was tachycardic with HR 120s in the ER. She presented to [facility] with 4-5 days of productive cough with dark colored sputum, chronic tobacco abuse and noted with hypoxia with room air sat 86%. Was given neb treatments and referred to the ER for further evaluation. She has known COPD and has been followed by PCP, uses Symbicort BID and Duoneb PRN. Chronic medical:  COPD, hypothyroid, psoriasis (on Methotrexate), tobacco abuse 1/2 PPD for 35+ years), obesity. She is not employed outside the home and cares for grandchildren. Was employed in [employer] for 2-3 years and had 2 pet poodles.     On exam was ambulating in the room, had occasional dry cough and states is feeling some better. States has claustrophobia and anxious being in the same room. Subjective:     Sitting up in chair, dry cough and still \"jittery and anxious\". Short of breath with conversation. O2 weaned to 3L. Hypertensive and requiring PRN meds.       Current Facility-Administered Medications   Medication Dose Route Frequency    enoxaparin (LOVENOX) injection 40 mg  40 mg SubCUTAneous Q24H    lisinopril (PRINIVIL, ZESTRIL) tablet 40 mg  40 mg Oral DAILY    hydroCHLOROthiazide (HYDRODIURIL) tablet 12.5 mg  12.5 mg Oral DAILY    LORazepam (ATIVAN) tablet 0.5 mg  0.5 mg Oral Q6H PRN    methylPREDNISolone (PF) (SOLU-MEDROL) injection 40 mg  40 mg IntraVENous Q6H    sodium chloride (OCEAN) 0.65 % nasal squeeze bottle 2 Spray  2 Spray Both Nostrils Q2H PRN    levothyroxine (SYNTHROID) tablet 25 mcg  25 mcg Oral 6am    nicotine (NICODERM CQ) 14 mg/24 hr patch 1 Patch  1 Patch TransDERmal DAILY    cloNIDine HCl (CATAPRES) tablet 0.2 mg  0.2 mg Oral BID PRN    hydrALAZINE (APRESOLINE) 20 mg/mL injection 10 mg  10 mg IntraVENous Q6H PRN    albuterol-ipratropium (DUO-NEB) 2.5 MG-0.5 MG/3 ML  3 mL Nebulization Q4H PRN    albuterol-ipratropium (DUO-NEB) 2.5 MG-0.5 MG/3 ML  3 mL Nebulization Q6H RT    azithromycin (ZITHROMAX) tablet 500 mg  500 mg Oral Q24H    cefTRIAXone (ROCEPHIN) 2 g in 0.9% sodium chloride (MBP/ADV) 50 mL  2 g IntraVENous Q24H    guaiFENesin-codeine (ROBITUSSIN AC) 100-10 mg/5 mL solution 10 mL  10 mL Oral Q4H PRN    zolpidem (AMBIEN) tablet 5 mg  5 mg Oral QHS PRN    sodium chloride (NS) flush 5-40 mL  5-40 mL IntraVENous Q8H    sodium chloride (NS) flush 5-40 mL  5-40 mL IntraVENous PRN    acetaminophen (TYLENOL) tablet 650 mg  650 mg Oral Q4H PRN    naloxone (NARCAN) injection 0.4 mg  0.4 mg IntraVENous PRN    ondansetron (ZOFRAN) injection 4 mg  4 mg IntraVENous Q4H PRN    budesonide (PULMICORT) 500 mcg/2 ml nebulizer suspension  500 mcg Nebulization BID RT       Review of Systems  Constitutional: negative for fever, chills, sweats  Cardiovascular: trace LE edema, negative for chest pain, palpitations, syncope  Gastrointestinal:  negative for dysphagia, reflux, vomiting, diarrhea, abdominal pain, or melena  Neurologic:  negative for focal weakness, numbness, headache    Objective:     Vitals:    05/25/19 0105 05/25/19 0351 05/25/19 0715 05/25/19 0726   BP:  122/68 (!) 142/93    Pulse:  92 92    Resp:  18 18    Temp:  97.5 °F (36.4 °C) 97.8 °F (36.6 °C)    SpO2: 95% 100% 91% 92%   Weight:       Height:         Intake and Output:   05/23 1901 - 05/25 0700  In: 600 [P.O.:600]  Out: 1800 [Urine:1800]  05/25 0701 - 05/25 1900  In: 240 [P.O.:240]  Out: -     Physical Exam:   Constitution:  the patient is well developed and in no acute distress, NC 3L sat 91%  EENMT:  Sclera clear, pupils equal, oral mucosa moist, nasal congestion  Respiratory: scattered anterior and posterior crackles and wheezing  Cardiovascular:  RRR without M,G,R  Gastrointestinal: soft and non-tender; with positive bowel sounds. Musculoskeletal: warm without cyanosis. There is trace bilateral lower extremity edema. Skin:  no jaundice or rashes, no wounds   Neurologic: no gross neuro deficits     Psychiatric:  alert and oriented x 3    CXR:   5/19/19: Minimal patchy right basilar airspace opacity favored atelectasis however difficult to exclude early pneumonia. LAB  No results for input(s): GLUCPOC in the last 72 hours. No lab exists for component: GLPOC   No results for input(s): WBC, HGB, HCT, PLT, INR, HGBEXT, HCTEXT, PLTEXT, HGBEXT, HCTEXT, PLTEXT in the last 72 hours. No lab exists for component: INREXT, INREXT  No results for input(s): NA, K, CL, CO2, GLU, BUN, CREA, MG, CA, PHOS, TROIQ, ALB, TBIL, TBILI, GPT, ALT, SGOT, BNPP in the last 72 hours. No lab exists for component: TROIP  No results for input(s): PH, PCO2, PO2, HCO3, PHI, PCO2I, PO2I, HCO3I in the last 72 hours. No results for input(s): LCAD, LAC in the last 72 hours.     Patient Active Problem List   Diagnosis Code    Hypothyroid E03.9    COPD with acute exacerbation ([facility] J44.1    Acute respiratory failure with hypoxia (HCC) J96.01    Psoriasis with arthropathy ([facility] L40.50    Obesity E66.9    Dependence on nicotine from cigarettes F17.210    Pneumonia of right lower lobe due to infectious organism ([facility] J18.1    HTN (hypertension) I10       Assessment:  (Medical Decision Making)     Hospital Problems  Date Reviewed: 5/24/2019          Codes Class Noted POA    HTN (hypertension) ICD-10-CM: I10  ICD-9-CM: 401.9  5/24/2019 Unknown        Pneumonia of right lower lobe due to infectious organism [facility]) ICD-10-CM: J18.1  ICD-9-CM: 728  5/21/2019 Yes        Hypothyroid (Chronic) ICD-10-CM: E03.9  ICD-9-CM: 244.9  5/19/2019 Yes        COPD with acute exacerbation ([facility] ICD-10-CM: J44.1  ICD-9-CM: 491.21  5/19/2019 Yes        Acute respiratory failure with hypoxia ([facility] ICD-10-CM: J96.01  ICD-9-CM: 518.81  5/19/2019 Yes        Psoriasis with arthropathy ([address] (Chronic) ICD-10-CM: L40.50  ICD-9-CM: 696.0  5/19/2019 Yes    Overview Signed 5/22/2019  8:35 AM by John Paul Vieira NP     On Methotrexate              Obesity (Chronic) ICD-10-CM: E66.9  ICD-9-CM: 278.00  5/19/2019 Yes        Dependence on nicotine from cigarettes (Chronic) ICD-10-CM: P96.262  ICD-9-CM: 305.1  5/19/2019 Yes              Plan:  (Medical Decision Making)     --Duoneb, Pulmicort  --Zithromax, Rocephin day 7- stop after today no need for PO abx after discharge. --Blood cultures:  No growth   --Solu Medrol 40mg k4p--Kpqwanzc to po prednisone  --Remains on 3L O2--was not on prior to admission. Will need O2 at discharge. --Arrange outpatient follow up in our office with PFTs and follow up A1AT  --Nicotine patch--smoking cessation stressed  --Requiring PRN BP meds--has no hx of HTN but now may benefit from antihypertensive.  --has oral thrush- diflucan 200 mg x 1 po then 100 mg po q d x 5 d. More than 50% of the time documented was spent in face-to-face contact with the patient and in the care of the patient on the floor/unit where the patient is located.     Willy Bryant MD

## 2019-05-25 NOTE — DISCHARGE INSTRUCTIONS
DISCHARGE SUMMARY from Nurse    PATIENT INSTRUCTIONS:    After general anesthesia or intravenous sedation, for 24 hours or while taking prescription Narcotics:  · Limit your activities  · Do not drive and operate hazardous machinery  · Do not make important personal or business decisions  · Do  not drink alcoholic beverages  · If you have not urinated within 8 hours after discharge, please contact your surgeon on call. What to do at Home:  Recommended activity: Activity as tolerated. If you experience any of the following symptoms temp > 101.5, worsening cough or wheezing, shortness of breath or fatigue not relieved with rest, please follow up with MD.    *  Please give a list of your current medications to your Primary Care Provider. *  Please update this list whenever your medications are discontinued, doses are      changed, or new medications (including over-the-counter products) are added. *  Please carry medication information at all times in case of emergency situations. These are general instructions for a healthy lifestyle:    No smoking/ No tobacco products/ Avoid exposure to second hand smoke  Surgeon General's Warning:  Quitting smoking now greatly reduces serious risk to your health. Obesity, smoking, and sedentary lifestyle greatly increases your risk for illness    A healthy diet, regular physical exercise & weight monitoring are important for maintaining a healthy lifestyle    You may be retaining fluid if you have a history of heart failure or if you experience any of the following symptoms:  Weight gain of 3 pounds or more overnight or 5 pounds in a week, increased swelling in our hands or feet or shortness of breath while lying flat in bed. Please call your doctor as soon as you notice any of these symptoms; do not wait until your next office visit.     Recognize signs and symptoms of STROKE:    F-face looks uneven    A-arms unable to move or move unevenly    S-speech slurred or non-existent    T-time-call 911 as soon as signs and symptoms begin-DO NOT go       Back to bed or wait to see if you get better-TIME IS BRAIN. Warning Signs of HEART ATTACK     Call 911 if you have these symptoms:   Chest discomfort. Most heart attacks involve discomfort in the center of the chest that lasts more than a few minutes, or that goes away and comes back. It can feel like uncomfortable pressure, squeezing, fullness, or pain.  Discomfort in other areas of the upper body. Symptoms can include pain or discomfort in one or both arms, the back, neck, jaw, or stomach.  Shortness of breath with or without chest discomfort.  Other signs may include breaking out in a cold sweat, nausea, or lightheadedness. Don't wait more than five minutes to call 911 - MINUTES MATTER! Fast action can save your life. Calling 911 is almost always the fastest way to get lifesaving treatment. Emergency Medical Services staff can begin treatment when they arrive -- up to an hour sooner than if someone gets to the hospital by car. The discharge information has been reviewed with the patient. The patient verbalized understanding. Discharge medications reviewed with the patient and appropriate educational materials and side effects teaching were provided. Patient Education   Patient Education        Pneumonia: Care Instructions  Your Care Instructions    Pneumonia is an infection of the lungs. Most cases are caused by infections from bacteria or viruses. Pneumonia may be mild or very severe. If it is caused by bacteria, you will be treated with antibiotics. It may take a few weeks to a few months to recover fully from pneumonia, depending on how sick you were and whether your overall health is good. Follow-up care is a key part of your treatment and safety. Be sure to make and go to all appointments, and call your doctor if you are having problems.  It's also a good idea to know your test results and keep a list of the medicines you take. How can you care for yourself at home? · Take your antibiotics exactly as directed. Do not stop taking the medicine just because you are feeling better. You need to take the full course of antibiotics. · Take your medicines exactly as prescribed. Call your doctor if you think you are having a problem with your medicine. · Get plenty of rest and sleep. You may feel weak and tired for a while, but your energy level will improve with time. · To prevent dehydration, drink plenty of fluids, enough so that your urine is light yellow or clear like water. Choose water and other caffeine-free clear liquids until you feel better. If you have kidney, heart, or liver disease and have to limit fluids, talk with your doctor before you increase the amount of fluids you drink. · Take care of your cough so you can rest. A cough that brings up mucus from your lungs is common with pneumonia. It is one way your body gets rid of the infection. But if coughing keeps you from resting or causes severe fatigue and chest-wall pain, talk to your doctor. He or she may suggest that you take a medicine to reduce the cough. · Use a vaporizer or humidifier to add moisture to your bedroom. Follow the directions for cleaning the machine. · Do not smoke or allow others to smoke around you. Smoke will make your cough last longer. If you need help quitting, talk to your doctor about stop-smoking programs and medicines. These can increase your chances of quitting for good. · Take an over-the-counter pain medicine, such as acetaminophen (Tylenol), ibuprofen (Advil, Motrin), or naproxen (Aleve). Read and follow all instructions on the label. · Do not take two or more pain medicines at the same time unless the doctor told you to. Many pain medicines have acetaminophen, which is Tylenol. Too much acetaminophen (Tylenol) can be harmful.   · If you were given a spirometer to measure how well your lungs are working, use it as instructed. This can help your doctor tell how your recovery is going. · To prevent pneumonia in the future, talk to your doctor about getting a flu vaccine (once a year) and a pneumococcal vaccine (one time only for most people). When should you call for help? Call 911 anytime you think you may need emergency care. For example, call if:    · You have severe trouble breathing.    Call your doctor now or seek immediate medical care if:    · You cough up dark brown or bloody mucus (sputum).     · You have new or worse trouble breathing.     · You are dizzy or lightheaded, or you feel like you may faint.    Watch closely for changes in your health, and be sure to contact your doctor if:    · You have a new or higher fever.     · You are coughing more deeply or more often.     · You are not getting better after 2 days (48 hours).     · You do not get better as expected. Where can you learn more? Go to http://jordan-bakari.info/. Enter [id number] in the search box to learn more about \"Pneumonia: Care Instructions. \"  Current as of: September 5, 2018  Content Version: 11.9  © 7098-7036 [employer]. Care instructions adapted under license by [employer] (which disclaims liability or warranty for this information). If you have questions about a medical condition or this instruction, always ask your healthcare professional. [place] [address] any warranty or liability for your use of this information. Chronic Obstructive Pulmonary Disease (COPD) Flare-Ups: Care Instructions  Your Care Instructions    Chronic obstructive pulmonary disease (COPD) is a lung disease that makes it hard to breathe. It is caused by damage to the lungs over many years, usually from smoking. COPD is often a mix of two diseases:  · Chronic bronchitis: The airways that carry air to the lungs (bronchial tubes) get inflamed and make a lot of mucus.  This can narrow or block the airways. · Emphysema: In a healthy person, the tiny air sacs in the lungs are like balloons. As you breathe in and out, they get bigger and smaller to move air through your lungs. But with emphysema, these air sacs are damaged and lose their stretch. Less air gets in and out of the lungs. Many people with COPD have attacks called flare-ups or exacerbations. This is when your usual symptoms quickly get worse and stay worse. The doctor has checked you carefully. But problems can develop later. If you notice any problems or new symptoms, get medical treatment right away. Follow-up care is a key part of your treatment and safety. Be sure to make and go to all appointments, and call your doctor if you are having problems. It's also a good idea to know your test results and keep a list of the medicines you take. How can you care for yourself at home? · Be safe with medicines. Take your medicines exactly as prescribed. Call your doctor if you think you are having a problem with your medicine. You may be taking medicines such as:  ? Bronchodilators. These help open your airways and make breathing easier. ? Corticosteroids. These reduce airway inflammation. They may be given as pills, in a vein, or in an inhaled form. You may go home with pills in addition to an inhaler that you already use. · A spacer may help you get more inhaled medicine to your lungs. Ask your doctor or pharmacist if a spacer is right for you. If it is, ask how to use it properly. · If your doctor prescribed antibiotics, take them as directed. Do not stop taking them just because you feel better. You need to take the full course of antibiotics. · If your doctor prescribed oxygen, use the flow rate your doctor has recommended. Do not change it without talking to your doctor first.  · Do not smoke. Smoking makes COPD worse. If you need help quitting, talk to your doctor about stop-smoking programs and medicines.  These can increase your chances of quitting for good. When should you call for help? Call 911 anytime you think you may need emergency care. For example, call if:    · You have severe trouble breathing.    Call your doctor now or seek immediate medical care if:    · You have new or worse trouble breathing.     · Your coughing or wheezing gets worse.     · You cough up dark brown or bloody mucus (sputum).     · You have a new or higher fever.    Watch closely for changes in your health, and be sure to contact your doctor if:    · You notice more mucus or a change in the color of your mucus.     · You need to use your antibiotic or steroid pills.     · You do not get better as expected. Where can you learn more? Go to http://jordan-bakari.info/. Enter N992 in the search box to learn more about \"Chronic Obstructive Pulmonary Disease (COPD) Flare-Ups: Care Instructions. \"  Current as of: September 5, 2018  Content Version: 11.9  © 2764-8448 [employer]. Care instructions adapted under license by [employer] (which disclaims liability or warranty for this information). If you have questions about a medical condition or this instruction, always ask your healthcare professional. [address] any warranty or liability for your use of this information.          ___________________________________________________________________________________________________________________________________

## 2019-05-25 NOTE — PROGRESS NOTES
Discharge instructions, follow up information, medication list, and prescription information provided and explained to the pt. IV removed from right forearm, no remote telemetry on. Portable oxygen tank provided and patient given instructions on use, company will contact patient for delivery of concentrator at home. Opportunity for questions provided. Instructed to call once ready to leave.

## 2019-06-14 PROBLEM — J44.9 CHRONIC OBSTRUCTIVE PULMONARY DISEASE (HCC): Status: ACTIVE | Noted: 2019-05-19

## 2019-06-14 PROBLEM — F17.211 CIGARETTE NICOTINE DEPENDENCE IN REMISSION: Chronic | Status: ACTIVE | Noted: 2019-06-14

## 2019-06-14 PROBLEM — R09.02 HYPOXIA: Status: ACTIVE | Noted: 2019-06-14

## 2019-06-14 PROBLEM — R09.02 HYPOXIA: Chronic | Status: ACTIVE | Noted: 2019-06-14

## 2019-06-14 PROBLEM — J44.9 CHRONIC OBSTRUCTIVE PULMONARY DISEASE (HCC): Chronic | Status: ACTIVE | Noted: 2019-05-19

## 2019-06-14 PROBLEM — F17.211 CIGARETTE NICOTINE DEPENDENCE IN REMISSION: Status: ACTIVE | Noted: 2019-06-14

## 2019-06-14 PROBLEM — J96.01 ACUTE RESPIRATORY FAILURE WITH HYPOXIA (HCC): Status: RESOLVED | Noted: 2019-05-19 | Resolved: 2019-06-14

## 2019-07-19 ENCOUNTER — HOSPITAL ENCOUNTER (OUTPATIENT)
Dept: GENERAL RADIOLOGY | Age: 54
Discharge: HOME | End: 2019-07-19
Attending: NURSE PRACTITIONER
Payer: COMMERCIAL

## 2019-07-19 DIAGNOSIS — J18.9 PNEUMONIA OF RIGHT LOWER LOBE DUE TO INFECTIOUS ORGANISM: ICD-10-CM

## 2019-07-19 PROCEDURE — 71046 X-RAY EXAM CHEST 2 VIEWS: CPT

## 2020-01-10 ENCOUNTER — HOSPITAL ENCOUNTER (OUTPATIENT)
Dept: GENERAL RADIOLOGY | Age: 55
Discharge: HOME | End: 2020-01-10
Payer: COMMERCIAL

## 2020-01-10 DIAGNOSIS — L40.50 ARTHRITIS WITH PSORIASIS (HCC): ICD-10-CM

## 2020-01-10 PROCEDURE — 73120 X-RAY EXAM OF HAND: CPT

## 2020-01-10 PROCEDURE — 73620 X-RAY EXAM OF FOOT: CPT

## 2020-01-10 PROCEDURE — 73600 X-RAY EXAM OF ANKLE: CPT

## 2020-01-24 PROBLEM — E66.01 SEVERE OBESITY (HCC): Status: ACTIVE | Noted: 2020-01-24

## 2021-12-03 PROBLEM — F17.210 DEPENDENCE ON NICOTINE FROM CIGARETTES: Chronic | Status: RESOLVED | Noted: 2019-05-19 | Resolved: 2021-12-03

## 2022-02-24 NOTE — PROGRESS NOTES
Pt in room alert and oriented. Dyspnea at rest. O2 6L NC in place. Breathing treatment given. Lung sounds are course with some crackles noted. Pt skin is red with psoriasis noted. Pt states she has had it for years. abd is soft bowel sounds are active. No increased bleeding or bruising noted. Safety measures in place will continue to monitor. Authored by Resident/PA/NP

## 2022-03-04 PROBLEM — G47.34 NOCTURNAL HYPOXIA: Status: ACTIVE | Noted: 2019-06-14

## 2022-03-04 PROBLEM — J30.9 ALLERGIC RHINITIS: Status: ACTIVE | Noted: 2022-03-04

## 2022-03-18 PROBLEM — F17.211 CIGARETTE NICOTINE DEPENDENCE IN REMISSION: Status: ACTIVE | Noted: 2019-06-14

## 2022-03-18 PROBLEM — J30.9 ALLERGIC RHINITIS: Status: ACTIVE | Noted: 2022-03-04

## 2022-03-18 PROBLEM — L40.50 PSORIASIS WITH ARTHROPATHY (HCC): Status: ACTIVE | Noted: 2019-05-19

## 2022-03-19 PROBLEM — G47.34 NOCTURNAL HYPOXIA: Status: ACTIVE | Noted: 2019-06-14

## 2022-03-19 PROBLEM — E03.9 HYPOTHYROID: Status: ACTIVE | Noted: 2019-05-19

## 2022-03-19 PROBLEM — J44.9 COPD, SEVERE (HCC): Status: ACTIVE | Noted: 2019-05-19

## 2022-03-19 PROBLEM — I10 HTN (HYPERTENSION): Status: ACTIVE | Noted: 2019-05-24

## 2022-03-19 PROBLEM — E66.9 OBESITY: Status: ACTIVE | Noted: 2019-05-19

## 2022-03-20 PROBLEM — E66.01 SEVERE OBESITY (HCC): Status: ACTIVE | Noted: 2020-01-24

## 2022-10-14 NOTE — PROGRESS NOTES
TRANSFER - IN REPORT: 
 
Verbal report received from [facility] MIRZA RN(name) on [facility]  being received from ED(unit) for routine progression of care Report consisted of patients Situation, Background, Assessment and  
Recommendations(SBAR). Information from the following report(s) SBAR was reviewed with the receiving nurse. Opportunity for questions and clarification was provided. Assessment completed upon patients arrival to unit and care assumed. Muscle Hinge Flap Text: The defect edges were debeveled with a #15 scalpel blade.  Given the size, depth and location of the defect and the proximity to free margins a muscle hinge flap was deemed most appropriate.  Using a sterile surgical marker, an appropriate hinge flap was drawn incorporating the defect. The area thus outlined was incised with a #15 scalpel blade.  The skin margins were undermined to an appropriate distance in all directions utilizing iris scissors.

## 2022-12-14 RX ORDER — FLUTICASONE FUROATE, UMECLIDINIUM BROMIDE AND VILANTEROL TRIFENATATE 200; 62.5; 25 UG/1; UG/1; UG/1
POWDER RESPIRATORY (INHALATION)
Qty: 1 EACH | Refills: 4 | Status: SHIPPED | OUTPATIENT
Start: 2022-12-14

## 2022-12-14 NOTE — TELEPHONE ENCOUNTER
Patient Refill Request     Last Office Visit: 03/04/2022     When they were supposed to follow up: 6 months  appt was scheduled  on 09/02/2022 it was cxl      Upcoming Office Visit: no appt scheduled      Refills Requested: yes     Type of refill: 30 day it states trelegy 200 in medication  list      Requested [facility]: [facility]     Is prescription pended?  Yes

## 2023-10-06 ENCOUNTER — APPOINTMENT (OUTPATIENT)
Dept: GENERAL RADIOLOGY | Age: 58
DRG: 871 | End: 2023-10-06
Payer: COMMERCIAL

## 2023-10-06 ENCOUNTER — HOSPITAL ENCOUNTER (INPATIENT)
Age: 58
LOS: 2 days | Discharge: HOME | DRG: 871 | End: 2023-10-08
Attending: EMERGENCY MEDICINE | Admitting: HOSPITALIST
Payer: COMMERCIAL

## 2023-10-06 DIAGNOSIS — R09.02 HYPOXIA: ICD-10-CM

## 2023-10-06 DIAGNOSIS — J18.9 PNEUMONIA OF LEFT LOWER LOBE DUE TO INFECTIOUS ORGANISM: Primary | ICD-10-CM

## 2023-10-06 PROBLEM — J96.01 ACUTE HYPOXEMIC RESPIRATORY FAILURE (HCC): Status: ACTIVE | Noted: 2023-10-06

## 2023-10-06 LAB
ALBUMIN SERPL-MCNC: 2.4 G/DL (ref 3.5–5)
ALBUMIN/GLOB SERPL: 0.4 (ref 0.4–1.6)
ALP SERPL-CCNC: 144 U/L (ref 50–136)
ALT SERPL-CCNC: 43 U/L (ref 12–65)
ANION GAP SERPL CALC-SCNC: 9 MMOL/L (ref 2–11)
AST SERPL-CCNC: 37 U/L (ref 15–37)
BASOPHILS # BLD: 0 K/UL (ref 0–0.2)
BASOPHILS NFR BLD: 0 % (ref 0–2)
BILIRUB SERPL-MCNC: 0.5 MG/DL (ref 0.2–1.1)
BUN SERPL-MCNC: 16 MG/DL (ref 6–23)
CALCIUM SERPL-MCNC: 8.5 MG/DL (ref 8.3–10.4)
CHLORIDE SERPL-SCNC: 97 MMOL/L (ref 101–110)
CO2 SERPL-SCNC: 24 MMOL/L (ref 21–32)
CREAT SERPL-MCNC: 1.1 MG/DL (ref 0.6–1)
DIFFERENTIAL METHOD BLD: ABNORMAL
EKG ATRIAL RATE: 134 BPM
EKG DIAGNOSIS: NORMAL
EKG P AXIS: 82 DEGREES
EKG P-R INTERVAL: 120 MS
EKG Q-T INTERVAL: 318 MS
EKG QRS DURATION: 78 MS
EKG QTC CALCULATION (BAZETT): 475 MS
EKG R AXIS: 104 DEGREES
EKG T AXIS: -44 DEGREES
EKG VENTRICULAR RATE: 134 BPM
EOSINOPHIL # BLD: 0 K/UL (ref 0–0.8)
EOSINOPHIL NFR BLD: 0 % (ref 0.5–7.8)
ERYTHROCYTE [DISTWIDTH] IN BLOOD BY AUTOMATED COUNT: 15.9 % (ref 11.9–14.6)
FLUAV RNA SPEC QL NAA+PROBE: NOT DETECTED
FLUBV RNA SPEC QL NAA+PROBE: NOT DETECTED
GLOBULIN SER CALC-MCNC: 5.4 G/DL (ref 2.8–4.5)
GLUCOSE BLD STRIP.AUTO-MCNC: 146 MG/DL (ref 65–100)
GLUCOSE BLD STRIP.AUTO-MCNC: 150 MG/DL (ref 65–100)
GLUCOSE BLD STRIP.AUTO-MCNC: 178 MG/DL (ref 65–100)
GLUCOSE SERPL-MCNC: 174 MG/DL (ref 65–100)
HCT VFR BLD AUTO: 39.9 % (ref 35.8–46.3)
HGB BLD-MCNC: 12.9 G/DL (ref 11.7–15.4)
IMM GRANULOCYTES # BLD AUTO: 1.4 K/UL (ref 0–0.5)
IMM GRANULOCYTES NFR BLD AUTO: 5 % (ref 0–5)
LACTATE SERPL-SCNC: 2 MMOL/L (ref 0.4–2)
LYMPHOCYTES # BLD: 1.1 K/UL (ref 0.5–4.6)
LYMPHOCYTES NFR BLD: 4 % (ref 13–44)
MAGNESIUM SERPL-MCNC: 1.5 MG/DL (ref 1.8–2.4)
MCH RBC QN AUTO: 30.1 PG (ref 26.1–32.9)
MCHC RBC AUTO-ENTMCNC: 32.3 G/DL (ref 31.4–35)
MCV RBC AUTO: 93 FL (ref 82–102)
MONOCYTES # BLD: 2.2 K/UL (ref 0.1–1.3)
MONOCYTES NFR BLD: 8 % (ref 4–12)
NEUTS SEG # BLD: 22.6 K/UL (ref 1.7–8.2)
NEUTS SEG NFR BLD: 83 % (ref 43–78)
NRBC # BLD: 0 K/UL (ref 0–0.2)
NT PRO BNP: 310 PG/ML (ref 5–125)
PHOSPHATE SERPL-MCNC: 2.2 MG/DL (ref 2.5–4.5)
PLATELET # BLD AUTO: 268 K/UL (ref 150–450)
PLATELET COMMENT: ADEQUATE
PMV BLD AUTO: 9.6 FL (ref 9.4–12.3)
POTASSIUM SERPL-SCNC: 3.1 MMOL/L (ref 3.5–5.1)
PROT SERPL-MCNC: 7.8 G/DL (ref 6.3–8.2)
RBC # BLD AUTO: 4.29 M/UL (ref 4.05–5.2)
RBC MORPH BLD: ABNORMAL
SARS-COV-2 RDRP RESP QL NAA+PROBE: NOT DETECTED
SERVICE CMNT-IMP: ABNORMAL
SODIUM SERPL-SCNC: 130 MMOL/L (ref 133–143)
SOURCE: NORMAL
WBC # BLD AUTO: 27.3 K/UL (ref 4.3–11.1)
WBC MORPH BLD: ABNORMAL

## 2023-10-06 PROCEDURE — 2580000003 HC RX 258: Performed by: HOSPITALIST

## 2023-10-06 PROCEDURE — 85025 COMPLETE CBC W/AUTO DIFF WBC: CPT

## 2023-10-06 PROCEDURE — 84100 ASSAY OF PHOSPHORUS: CPT

## 2023-10-06 PROCEDURE — 36415 COLL VENOUS BLD VENIPUNCTURE: CPT

## 2023-10-06 PROCEDURE — 96361 HYDRATE IV INFUSION ADD-ON: CPT

## 2023-10-06 PROCEDURE — 2500000003 HC RX 250 WO HCPCS: Performed by: HOSPITALIST

## 2023-10-06 PROCEDURE — 2580000003 HC RX 258: Performed by: EMERGENCY MEDICINE

## 2023-10-06 PROCEDURE — 87040 BLOOD CULTURE FOR BACTERIA: CPT

## 2023-10-06 PROCEDURE — 71045 X-RAY EXAM CHEST 1 VIEW: CPT

## 2023-10-06 PROCEDURE — 93005 ELECTROCARDIOGRAM TRACING: CPT | Performed by: EMERGENCY MEDICINE

## 2023-10-06 PROCEDURE — 94640 AIRWAY INHALATION TREATMENT: CPT

## 2023-10-06 PROCEDURE — 94762 N-INVAS EAR/PLS OXIMTRY CONT: CPT

## 2023-10-06 PROCEDURE — 87635 SARS-COV-2 COVID-19 AMP PRB: CPT

## 2023-10-06 PROCEDURE — 6370000000 HC RX 637 (ALT 250 FOR IP): Performed by: HOSPITALIST

## 2023-10-06 PROCEDURE — 87502 INFLUENZA DNA AMP PROBE: CPT

## 2023-10-06 PROCEDURE — 99285 EMERGENCY DEPT VISIT HI MDM: CPT

## 2023-10-06 PROCEDURE — 1100000003 HC PRIVATE W/ TELEMETRY

## 2023-10-06 PROCEDURE — 83605 ASSAY OF LACTIC ACID: CPT

## 2023-10-06 PROCEDURE — 83735 ASSAY OF MAGNESIUM: CPT

## 2023-10-06 PROCEDURE — 6360000002 HC RX W HCPCS: Performed by: EMERGENCY MEDICINE

## 2023-10-06 PROCEDURE — 80053 COMPREHEN METABOLIC PANEL: CPT

## 2023-10-06 PROCEDURE — 83880 ASSAY OF NATRIURETIC PEPTIDE: CPT

## 2023-10-06 PROCEDURE — 96365 THER/PROPH/DIAG IV INF INIT: CPT

## 2023-10-06 PROCEDURE — 6360000002 HC RX W HCPCS: Performed by: HOSPITALIST

## 2023-10-06 PROCEDURE — 6370000000 HC RX 637 (ALT 250 FOR IP): Performed by: NURSE PRACTITIONER

## 2023-10-06 PROCEDURE — 82962 GLUCOSE BLOOD TEST: CPT

## 2023-10-06 RX ORDER — IPRATROPIUM BROMIDE AND ALBUTEROL SULFATE 2.5; .5 MG/3ML; MG/3ML
1 SOLUTION RESPIRATORY (INHALATION) EVERY 4 HOURS PRN
Status: DISCONTINUED | OUTPATIENT
Start: 2023-10-06 | End: 2023-10-08 | Stop reason: HOSPADM

## 2023-10-06 RX ORDER — INSULIN LISPRO 100 [IU]/ML
0-4 INJECTION, SOLUTION INTRAVENOUS; SUBCUTANEOUS NIGHTLY
Status: DISCONTINUED | OUTPATIENT
Start: 2023-10-06 | End: 2023-10-08 | Stop reason: HOSPADM

## 2023-10-06 RX ORDER — MAGNESIUM SULFATE IN WATER 40 MG/ML
2000 INJECTION, SOLUTION INTRAVENOUS ONCE
Status: COMPLETED | OUTPATIENT
Start: 2023-10-06 | End: 2023-10-06

## 2023-10-06 RX ORDER — IPRATROPIUM BROMIDE AND ALBUTEROL SULFATE 2.5; .5 MG/3ML; MG/3ML
1 SOLUTION RESPIRATORY (INHALATION) 3 TIMES DAILY
Status: DISCONTINUED | OUTPATIENT
Start: 2023-10-06 | End: 2023-10-06

## 2023-10-06 RX ORDER — SODIUM CHLORIDE AND POTASSIUM CHLORIDE 150; 900 MG/100ML; MG/100ML
INJECTION, SOLUTION INTRAVENOUS CONTINUOUS
Status: DISCONTINUED | OUTPATIENT
Start: 2023-10-06 | End: 2023-10-06

## 2023-10-06 RX ORDER — SODIUM CHLORIDE 0.9 % (FLUSH) 0.9 %
5-40 SYRINGE (ML) INJECTION PRN
Status: DISCONTINUED | OUTPATIENT
Start: 2023-10-06 | End: 2023-10-08 | Stop reason: HOSPADM

## 2023-10-06 RX ORDER — SODIUM CHLORIDE 0.9 % (FLUSH) 0.9 %
5-40 SYRINGE (ML) INJECTION EVERY 12 HOURS SCHEDULED
Status: DISCONTINUED | OUTPATIENT
Start: 2023-10-06 | End: 2023-10-08 | Stop reason: HOSPADM

## 2023-10-06 RX ORDER — ACETAMINOPHEN 325 MG/1
650 TABLET ORAL EVERY 6 HOURS PRN
Status: DISCONTINUED | OUTPATIENT
Start: 2023-10-06 | End: 2023-10-08 | Stop reason: HOSPADM

## 2023-10-06 RX ORDER — SERTRALINE HYDROCHLORIDE 25 MG/1
25 TABLET, FILM COATED ORAL NIGHTLY
COMMUNITY

## 2023-10-06 RX ORDER — LEVOTHYROXINE SODIUM 0.05 MG/1
25 TABLET ORAL
Status: DISCONTINUED | OUTPATIENT
Start: 2023-10-07 | End: 2023-10-08 | Stop reason: HOSPADM

## 2023-10-06 RX ORDER — ACETAMINOPHEN 650 MG/1
650 SUPPOSITORY RECTAL EVERY 6 HOURS PRN
Status: DISCONTINUED | OUTPATIENT
Start: 2023-10-06 | End: 2023-10-08 | Stop reason: HOSPADM

## 2023-10-06 RX ORDER — 0.9 % SODIUM CHLORIDE 0.9 %
1000 INTRAVENOUS SOLUTION INTRAVENOUS
Status: COMPLETED | OUTPATIENT
Start: 2023-10-06 | End: 2023-10-06

## 2023-10-06 RX ORDER — POLYETHYLENE GLYCOL 3350 17 G/17G
17 POWDER, FOR SOLUTION ORAL DAILY PRN
Status: DISCONTINUED | OUTPATIENT
Start: 2023-10-06 | End: 2023-10-08 | Stop reason: HOSPADM

## 2023-10-06 RX ORDER — ONDANSETRON 2 MG/ML
4 INJECTION INTRAMUSCULAR; INTRAVENOUS EVERY 6 HOURS PRN
Status: DISCONTINUED | OUTPATIENT
Start: 2023-10-06 | End: 2023-10-08 | Stop reason: HOSPADM

## 2023-10-06 RX ORDER — SODIUM CHLORIDE 0.9 % (FLUSH) 0.9 %
10 SYRINGE (ML) INJECTION AS NEEDED
Status: DISCONTINUED | OUTPATIENT
Start: 2023-10-06 | End: 2023-10-08 | Stop reason: HOSPADM

## 2023-10-06 RX ORDER — LANOLIN ALCOHOL/MO/W.PET/CERES
400 CREAM (GRAM) TOPICAL 2 TIMES DAILY
Status: DISCONTINUED | OUTPATIENT
Start: 2023-10-07 | End: 2023-10-08 | Stop reason: HOSPADM

## 2023-10-06 RX ORDER — PREDNISONE 20 MG/1
20 TABLET ORAL DAILY
Status: DISCONTINUED | OUTPATIENT
Start: 2023-10-06 | End: 2023-10-07

## 2023-10-06 RX ORDER — SODIUM CHLORIDE 0.9 % (FLUSH) 0.9 %
10 SYRINGE (ML) INJECTION EVERY 8 HOURS
Status: DISCONTINUED | OUTPATIENT
Start: 2023-10-06 | End: 2023-10-06

## 2023-10-06 RX ORDER — 0.9 % SODIUM CHLORIDE 0.9 %
1000 INTRAVENOUS SOLUTION INTRAVENOUS ONCE
Status: COMPLETED | OUTPATIENT
Start: 2023-10-06 | End: 2023-10-06

## 2023-10-06 RX ORDER — LANOLIN ALCOHOL/MO/W.PET/CERES
3 CREAM (GRAM) TOPICAL NIGHTLY PRN
Status: DISCONTINUED | OUTPATIENT
Start: 2023-10-06 | End: 2023-10-08 | Stop reason: HOSPADM

## 2023-10-06 RX ORDER — ONDANSETRON 4 MG/1
4 TABLET, ORALLY DISINTEGRATING ORAL EVERY 8 HOURS PRN
Status: DISCONTINUED | OUTPATIENT
Start: 2023-10-06 | End: 2023-10-08 | Stop reason: HOSPADM

## 2023-10-06 RX ORDER — SODIUM CHLORIDE 9 MG/ML
INJECTION, SOLUTION INTRAVENOUS PRN
Status: DISCONTINUED | OUTPATIENT
Start: 2023-10-06 | End: 2023-10-08 | Stop reason: HOSPADM

## 2023-10-06 RX ORDER — ENOXAPARIN SODIUM 100 MG/ML
40 INJECTION SUBCUTANEOUS EVERY 24 HOURS
Status: DISCONTINUED | OUTPATIENT
Start: 2023-10-06 | End: 2023-10-08 | Stop reason: HOSPADM

## 2023-10-06 RX ORDER — SODIUM CHLORIDE AND POTASSIUM CHLORIDE 150; 900 MG/100ML; MG/100ML
INJECTION, SOLUTION INTRAVENOUS CONTINUOUS
Status: DISCONTINUED | OUTPATIENT
Start: 2023-10-06 | End: 2023-10-07

## 2023-10-06 RX ORDER — AZITHROMYCIN 250 MG/1
500 TABLET, FILM COATED ORAL DAILY
Status: DISCONTINUED | OUTPATIENT
Start: 2023-10-06 | End: 2023-10-08 | Stop reason: HOSPADM

## 2023-10-06 RX ORDER — POTASSIUM CHLORIDE 20 MEQ/1
40 TABLET, EXTENDED RELEASE ORAL 2 TIMES DAILY WITH MEALS
Status: DISCONTINUED | OUTPATIENT
Start: 2023-10-06 | End: 2023-10-06

## 2023-10-06 RX ORDER — INSULIN LISPRO 100 [IU]/ML
0-8 INJECTION, SOLUTION INTRAVENOUS; SUBCUTANEOUS
Status: DISCONTINUED | OUTPATIENT
Start: 2023-10-06 | End: 2023-10-08 | Stop reason: HOSPADM

## 2023-10-06 RX ADMIN — SODIUM CHLORIDE 1000 ML: 9 INJECTION, SOLUTION INTRAVENOUS at 15:49

## 2023-10-06 RX ADMIN — ENOXAPARIN SODIUM 40 MG: 100 INJECTION SUBCUTANEOUS at 16:49

## 2023-10-06 RX ADMIN — ACETAMINOPHEN 650 MG: 325 TABLET ORAL at 20:13

## 2023-10-06 RX ADMIN — POTASSIUM CHLORIDE AND SODIUM CHLORIDE: 900; 150 INJECTION, SOLUTION INTRAVENOUS at 19:59

## 2023-10-06 RX ADMIN — MAGNESIUM SULFATE HEPTAHYDRATE 2000 MG: 40 INJECTION, SOLUTION INTRAVENOUS at 16:57

## 2023-10-06 RX ADMIN — SODIUM CHLORIDE, PRESERVATIVE FREE 10 ML: 5 INJECTION INTRAVENOUS at 16:43

## 2023-10-06 RX ADMIN — PREDNISONE 20 MG: 20 TABLET ORAL at 21:07

## 2023-10-06 RX ADMIN — IPRATROPIUM BROMIDE AND ALBUTEROL SULFATE 1 DOSE: .5; 3 SOLUTION RESPIRATORY (INHALATION) at 16:10

## 2023-10-06 RX ADMIN — CEFTRIAXONE 1000 MG: 1 INJECTION, POWDER, FOR SOLUTION INTRAMUSCULAR; INTRAVENOUS at 15:21

## 2023-10-06 RX ADMIN — SODIUM CHLORIDE 1000 ML: 9 INJECTION, SOLUTION INTRAVENOUS at 15:50

## 2023-10-06 RX ADMIN — TUBERCULIN PURIFIED PROTEIN DERIVATIVE 5 UNITS: 5 INJECTION, SOLUTION INTRADERMAL at 18:52

## 2023-10-06 RX ADMIN — SODIUM CHLORIDE 1000 ML: 9 INJECTION, SOLUTION INTRAVENOUS at 14:07

## 2023-10-06 RX ADMIN — SODIUM CHLORIDE 1000 ML: 9 INJECTION, SOLUTION INTRAVENOUS at 18:50

## 2023-10-06 RX ADMIN — POTASSIUM & SODIUM PHOSPHATES POWDER PACK 280-160-250 MG 500 MG: 280-160-250 PACK at 20:13

## 2023-10-06 RX ADMIN — SERTRALINE 25 MG: 50 TABLET, FILM COATED ORAL at 20:31

## 2023-10-06 RX ADMIN — AZITHROMYCIN DIHYDRATE 500 MG: 250 TABLET ORAL at 18:55

## 2023-10-06 RX ADMIN — SODIUM CHLORIDE, PRESERVATIVE FREE 10 ML: 5 INJECTION INTRAVENOUS at 20:01

## 2023-10-06 NOTE — H&P
[unfilled]    INTERNAL MEDICINE H&P/CONSULT    Subjective:     30-year-old white female history of COPD on 3L oxygen at night, NIDDM, hypothyroidism,  present with generalized weakness, nausea, vomiting, dry cough, fever 102, chills, some sore throat for 1 week. On further questioning, she stopped tobacco 5y ago. She is on 1L O2 during my interview.  at bedside. Past Medical History:   Diagnosis Date    Acute respiratory failure with hypoxia ([address]) 5/19/2019    COPD (chronic obstructive pulmonary disease) ([address]) 05/19/2019    Dependence on nicotine from cigarettes 5/19/2019    Hypothyroidism 05/19/2019    Pneumonia of right lower lobe due to infectious organism 5/21/2019    Psoriasis       Past Surgical History:   Procedure Laterality Date    CHOLECYSTECTOMY  1990's      Prior to Admission medications    Medication Sig Start Date End Date Taking? Authorizing Provider   fluticasone-umeclidin-vilant (TRELEGY ELLIPTA) 200-62.5-25 MCG/ACT AEPB inhaler 1 inhalation every morning, rinse mouth after use 12/14/22   Chelsie Rodriguez P, APRN - CNP   OXYGEN 3 lpm HS    Automatic Reconciliation, Ar   albuterol (PROVENTIL) (2.5 MG/3ML) 0.083% nebulizer solution 1 vial via nebulizer 4 times daily as needed for shortness of breath, wheezing. Diagnosis COPD, J 44.9. 3/4/22   Automatic Reconciliation, Ar   albuterol sulfate  (90 Base) MCG/ACT inhaler 2 puffs 4 times daily if needed for shortness of breath or wheezing.  3/4/22   Automatic Reconciliation, Ar   folic acid (FOLVITE) 1 MG tablet Take by mouth daily    Automatic Reconciliation, Ar   levothyroxine (SYNTHROID) 25 MCG tablet Take 25 mcg by mouth every morning (before breakfast)    Automatic Reconciliation, Ar   meloxicam (MOBIC) 15 MG tablet Take 15 mg by mouth daily    Automatic Reconciliation, Ar   metFORMIN, MOD, (GLUMETZA) 500 MG extended release tablet Take by mouth    Automatic Reconciliation, Ar   Methotrexate, PF, (OTREXUP) 25 MG/0.4ML SOAJ

## 2023-10-06 NOTE — ED PROVIDER NOTES
Voice dictation software was used during the making of this note. This software is not perfect and grammatical and other typographical errors may be present. This note has not been completely proofread for errors.      Loki Person MD  10/06/23 [id number]

## 2023-10-06 NOTE — ED TRIAGE NOTES
Patient a 59 y/o Female reports to the ED with complaint of Shortness of breath. Hx of COPD. Denies any swelling in her extremities. States she has been feeling congested. Wears 3L O2 at night. Denies taking any water pills.

## 2023-10-07 PROBLEM — L40.50 PSORIASIS WITH ARTHROPATHY (HCC): Chronic | Status: ACTIVE | Noted: 2019-05-19

## 2023-10-07 PROBLEM — E11.65 TYPE 2 DIABETES MELLITUS WITH HYPERGLYCEMIA, WITHOUT LONG-TERM CURRENT USE OF INSULIN (HCC): Status: ACTIVE | Noted: 2023-10-07

## 2023-10-07 PROBLEM — J15.9 BACTERIAL PNEUMONIA: Status: ACTIVE | Noted: 2023-10-07

## 2023-10-07 PROBLEM — J30.9 ALLERGIC RHINITIS: Chronic | Status: ACTIVE | Noted: 2022-03-04

## 2023-10-07 PROBLEM — I10 HTN (HYPERTENSION): Status: ACTIVE | Noted: 2019-05-24

## 2023-10-07 PROBLEM — E03.9 HYPOTHYROID: Status: ACTIVE | Noted: 2019-05-19

## 2023-10-07 PROBLEM — E03.9 HYPOTHYROID: Chronic | Status: ACTIVE | Noted: 2019-05-19

## 2023-10-07 PROBLEM — E66.01 SEVERE OBESITY (HCC): Status: ACTIVE | Noted: 2020-01-24

## 2023-10-07 PROBLEM — E66.01 SEVERE OBESITY (HCC): Chronic | Status: ACTIVE | Noted: 2020-01-24

## 2023-10-07 PROBLEM — F17.211 CIGARETTE NICOTINE DEPENDENCE IN REMISSION: Chronic | Status: ACTIVE | Noted: 2019-06-14

## 2023-10-07 PROBLEM — J44.9 COPD, SEVERE (HCC): Chronic | Status: ACTIVE | Noted: 2019-05-19

## 2023-10-07 PROBLEM — I10 HTN (HYPERTENSION): Chronic | Status: ACTIVE | Noted: 2019-05-24

## 2023-10-07 PROBLEM — J44.0 COPD WITH ACUTE LOWER RESPIRATORY INFECTION (HCC): Status: ACTIVE | Noted: 2023-10-07

## 2023-10-07 PROBLEM — J18.9 SEPSIS DUE TO PNEUMONIA (HCC): Status: ACTIVE | Noted: 2023-10-07

## 2023-10-07 PROBLEM — A41.9 SEPSIS DUE TO PNEUMONIA (HCC): Status: ACTIVE | Noted: 2023-10-07

## 2023-10-07 PROBLEM — G47.34 NOCTURNAL HYPOXIA: Chronic | Status: ACTIVE | Noted: 2019-06-14

## 2023-10-07 LAB
ANION GAP SERPL CALC-SCNC: 7 MMOL/L (ref 2–11)
BUN SERPL-MCNC: 14 MG/DL (ref 6–23)
CALCIUM SERPL-MCNC: 8.1 MG/DL (ref 8.3–10.4)
CHLORIDE SERPL-SCNC: 106 MMOL/L (ref 101–110)
CO2 SERPL-SCNC: 23 MMOL/L (ref 21–32)
CREAT SERPL-MCNC: 0.7 MG/DL (ref 0.6–1)
ERYTHROCYTE [DISTWIDTH] IN BLOOD BY AUTOMATED COUNT: 16.3 % (ref 11.9–14.6)
GLUCOSE BLD STRIP.AUTO-MCNC: 118 MG/DL (ref 65–100)
GLUCOSE BLD STRIP.AUTO-MCNC: 177 MG/DL (ref 65–100)
GLUCOSE BLD STRIP.AUTO-MCNC: 196 MG/DL (ref 65–100)
GLUCOSE BLD STRIP.AUTO-MCNC: 227 MG/DL (ref 65–100)
GLUCOSE SERPL-MCNC: 120 MG/DL (ref 65–100)
HCT VFR BLD AUTO: 34.2 % (ref 35.8–46.3)
HGB BLD-MCNC: 10.8 G/DL (ref 11.7–15.4)
MAGNESIUM SERPL-MCNC: 2.1 MG/DL (ref 1.8–2.4)
MCH RBC QN AUTO: 30 PG (ref 26.1–32.9)
MCHC RBC AUTO-ENTMCNC: 31.6 G/DL (ref 31.4–35)
MCV RBC AUTO: 95 FL (ref 82–102)
MM INDURATION, POC: 0 MM (ref 0–5)
NRBC # BLD: 0 K/UL (ref 0–0.2)
PHOSPHATE SERPL-MCNC: 3.2 MG/DL (ref 2.5–4.5)
PLATELET # BLD AUTO: 240 K/UL (ref 150–450)
PMV BLD AUTO: 9.6 FL (ref 9.4–12.3)
POTASSIUM SERPL-SCNC: 3.4 MMOL/L (ref 3.5–5.1)
PPD, POC: NEGATIVE
RBC # BLD AUTO: 3.6 M/UL (ref 4.05–5.2)
SERVICE CMNT-IMP: ABNORMAL
SODIUM SERPL-SCNC: 136 MMOL/L (ref 133–143)
WBC # BLD AUTO: 25.3 K/UL (ref 4.3–11.1)

## 2023-10-07 PROCEDURE — 97165 OT EVAL LOW COMPLEX 30 MIN: CPT

## 2023-10-07 PROCEDURE — 6360000002 HC RX W HCPCS: Performed by: INTERNAL MEDICINE

## 2023-10-07 PROCEDURE — 94761 N-INVAS EAR/PLS OXIMETRY MLT: CPT

## 2023-10-07 PROCEDURE — 2700000000 HC OXYGEN THERAPY PER DAY

## 2023-10-07 PROCEDURE — 97530 THERAPEUTIC ACTIVITIES: CPT

## 2023-10-07 PROCEDURE — 97112 NEUROMUSCULAR REEDUCATION: CPT

## 2023-10-07 PROCEDURE — 6370000000 HC RX 637 (ALT 250 FOR IP): Performed by: INTERNAL MEDICINE

## 2023-10-07 PROCEDURE — 1100000000 HC RM PRIVATE

## 2023-10-07 PROCEDURE — 83735 ASSAY OF MAGNESIUM: CPT

## 2023-10-07 PROCEDURE — 84100 ASSAY OF PHOSPHORUS: CPT

## 2023-10-07 PROCEDURE — 82962 GLUCOSE BLOOD TEST: CPT

## 2023-10-07 PROCEDURE — 2580000003 HC RX 258: Performed by: INTERNAL MEDICINE

## 2023-10-07 PROCEDURE — 36415 COLL VENOUS BLD VENIPUNCTURE: CPT

## 2023-10-07 PROCEDURE — 80048 BASIC METABOLIC PNL TOTAL CA: CPT

## 2023-10-07 PROCEDURE — 94640 AIRWAY INHALATION TREATMENT: CPT

## 2023-10-07 PROCEDURE — 6370000000 HC RX 637 (ALT 250 FOR IP): Performed by: NURSE PRACTITIONER

## 2023-10-07 PROCEDURE — 97535 SELF CARE MNGMENT TRAINING: CPT

## 2023-10-07 PROCEDURE — 1100000003 HC PRIVATE W/ TELEMETRY

## 2023-10-07 PROCEDURE — 6370000000 HC RX 637 (ALT 250 FOR IP): Performed by: HOSPITALIST

## 2023-10-07 PROCEDURE — 2580000003 HC RX 258: Performed by: HOSPITALIST

## 2023-10-07 PROCEDURE — 85027 COMPLETE CBC AUTOMATED: CPT

## 2023-10-07 PROCEDURE — 94760 N-INVAS EAR/PLS OXIMETRY 1: CPT

## 2023-10-07 PROCEDURE — 6360000002 HC RX W HCPCS: Performed by: HOSPITALIST

## 2023-10-07 PROCEDURE — 97161 PT EVAL LOW COMPLEX 20 MIN: CPT

## 2023-10-07 RX ORDER — PANTOPRAZOLE SODIUM 40 MG/1
40 TABLET, DELAYED RELEASE ORAL
Status: DISCONTINUED | OUTPATIENT
Start: 2023-10-07 | End: 2023-10-08 | Stop reason: HOSPADM

## 2023-10-07 RX ORDER — DOCOSANOL 100 MG/G
CREAM TOPICAL
Status: DISCONTINUED | OUTPATIENT
Start: 2023-10-07 | End: 2023-10-08 | Stop reason: HOSPADM

## 2023-10-07 RX ORDER — LOSARTAN POTASSIUM 50 MG/1
25 TABLET ORAL DAILY
Status: DISCONTINUED | OUTPATIENT
Start: 2023-10-07 | End: 2023-10-08 | Stop reason: HOSPADM

## 2023-10-07 RX ORDER — ONDANSETRON 4 MG/1
4 TABLET, ORALLY DISINTEGRATING ORAL 3 TIMES DAILY PRN
Status: ON HOLD | COMMUNITY
Start: 2023-10-02 | End: 2023-10-08 | Stop reason: HOSPADM

## 2023-10-07 RX ORDER — MELOXICAM 7.5 MG/1
15 TABLET ORAL DAILY
Status: DISCONTINUED | OUTPATIENT
Start: 2023-10-07 | End: 2023-10-08 | Stop reason: HOSPADM

## 2023-10-07 RX ORDER — ROSUVASTATIN CALCIUM 10 MG/1
10 TABLET, COATED ORAL
COMMUNITY
Start: 2023-05-13

## 2023-10-07 RX ORDER — ROSUVASTATIN CALCIUM 10 MG/1
10 TABLET, COATED ORAL NIGHTLY
Status: DISCONTINUED | OUTPATIENT
Start: 2023-10-07 | End: 2023-10-08 | Stop reason: HOSPADM

## 2023-10-07 RX ORDER — DEXTROSE MONOHYDRATE 100 MG/ML
INJECTION, SOLUTION INTRAVENOUS CONTINUOUS PRN
Status: DISCONTINUED | OUTPATIENT
Start: 2023-10-07 | End: 2023-10-08 | Stop reason: HOSPADM

## 2023-10-07 RX ORDER — LOSARTAN POTASSIUM 25 MG/1
25 TABLET ORAL DAILY
COMMUNITY
Start: 2023-09-26

## 2023-10-07 RX ORDER — GUAIFENESIN 600 MG/1
1200 TABLET, EXTENDED RELEASE ORAL 2 TIMES DAILY
Status: DISCONTINUED | OUTPATIENT
Start: 2023-10-07 | End: 2023-10-08 | Stop reason: HOSPADM

## 2023-10-07 RX ORDER — POTASSIUM CHLORIDE 20 MEQ/1
40 TABLET, EXTENDED RELEASE ORAL ONCE
Status: COMPLETED | OUTPATIENT
Start: 2023-10-07 | End: 2023-10-07

## 2023-10-07 RX ORDER — PANTOPRAZOLE SODIUM 40 MG/1
40 TABLET, DELAYED RELEASE ORAL DAILY
Qty: 30 TABLET | Refills: 11 | COMMUNITY
Start: 2023-05-13 | End: 2024-05-12

## 2023-10-07 RX ADMIN — MELOXICAM 15 MG: 7.5 TABLET ORAL at 09:59

## 2023-10-07 RX ADMIN — DOCOSANOL: 100 CREAM TOPICAL at 10:44

## 2023-10-07 RX ADMIN — TIOTROPIUM BROMIDE INHALATION SPRAY 2 PUFF: 3.12 SPRAY, METERED RESPIRATORY (INHALATION) at 11:06

## 2023-10-07 RX ADMIN — MAGNESIUM GLUCONATE 500 MG ORAL TABLET 400 MG: 500 TABLET ORAL at 08:09

## 2023-10-07 RX ADMIN — MAGNESIUM GLUCONATE 500 MG ORAL TABLET 400 MG: 500 TABLET ORAL at 20:05

## 2023-10-07 RX ADMIN — AZITHROMYCIN DIHYDRATE 500 MG: 250 TABLET ORAL at 08:09

## 2023-10-07 RX ADMIN — WATER 40 MG: 1 INJECTION INTRAMUSCULAR; INTRAVENOUS; SUBCUTANEOUS at 09:55

## 2023-10-07 RX ADMIN — SODIUM CHLORIDE, PRESERVATIVE FREE 10 ML: 5 INJECTION INTRAVENOUS at 08:11

## 2023-10-07 RX ADMIN — PREDNISONE 20 MG: 20 TABLET ORAL at 08:10

## 2023-10-07 RX ADMIN — MOMETASONE FUROATE AND FORMOTEROL FUMARATE DIHYDRATE 2 PUFF: 200; 5 AEROSOL RESPIRATORY (INHALATION) at 11:06

## 2023-10-07 RX ADMIN — LEVOTHYROXINE SODIUM 25 MCG: 0.05 TABLET ORAL at 05:17

## 2023-10-07 RX ADMIN — MOMETASONE FUROATE AND FORMOTEROL FUMARATE DIHYDRATE 2 PUFF: 200; 5 AEROSOL RESPIRATORY (INHALATION) at 20:10

## 2023-10-07 RX ADMIN — PANTOPRAZOLE SODIUM 40 MG: 40 TABLET, DELAYED RELEASE ORAL at 11:27

## 2023-10-07 RX ADMIN — ENOXAPARIN SODIUM 40 MG: 100 INJECTION SUBCUTANEOUS at 15:20

## 2023-10-07 RX ADMIN — DOCOSANOL: 100 CREAM TOPICAL at 15:27

## 2023-10-07 RX ADMIN — GUAIFENESIN 1200 MG: 600 TABLET ORAL at 09:54

## 2023-10-07 RX ADMIN — SODIUM CHLORIDE, PRESERVATIVE FREE 10 ML: 5 INJECTION INTRAVENOUS at 20:07

## 2023-10-07 RX ADMIN — POTASSIUM CHLORIDE AND SODIUM CHLORIDE: 900; 150 INJECTION, SOLUTION INTRAVENOUS at 04:13

## 2023-10-07 RX ADMIN — LOSARTAN POTASSIUM 25 MG: 50 TABLET, FILM COATED ORAL at 09:59

## 2023-10-07 RX ADMIN — POTASSIUM & SODIUM PHOSPHATES POWDER PACK 280-160-250 MG 500 MG: 280-160-250 PACK at 08:10

## 2023-10-07 RX ADMIN — SERTRALINE 25 MG: 50 TABLET, FILM COATED ORAL at 20:05

## 2023-10-07 RX ADMIN — WATER 40 MG: 1 INJECTION INTRAMUSCULAR; INTRAVENOUS; SUBCUTANEOUS at 17:40

## 2023-10-07 RX ADMIN — GUAIFENESIN 1200 MG: 600 TABLET ORAL at 20:05

## 2023-10-07 RX ADMIN — POTASSIUM CHLORIDE 40 MEQ: 1500 TABLET, EXTENDED RELEASE ORAL at 09:54

## 2023-10-07 RX ADMIN — CEFTRIAXONE SODIUM 2000 MG: 2 INJECTION, POWDER, FOR SOLUTION INTRAMUSCULAR; INTRAVENOUS at 15:26

## 2023-10-07 RX ADMIN — ROSUVASTATIN CALCIUM 10 MG: 10 TABLET, FILM COATED ORAL at 20:05

## 2023-10-07 RX ADMIN — DOCOSANOL: 100 CREAM TOPICAL at 20:05

## 2023-10-07 NOTE — PLAN OF CARE
Problem: Discharge Planning  Goal: Discharge to home or other facility with appropriate resources  Outcome: Progressing  Flowsheets (Taken 10/6/2023 1901)  Discharge to home or other facility with appropriate resources:   Identify barriers to discharge with patient and caregiver   Arrange for needed discharge resources and transportation as appropriate   Identify discharge learning needs (meds, wound care, etc)   Refer to discharge planning if patient needs post-hospital services based on physician order or complex needs related to functional status, cognitive ability or social support system     Problem: Safety - Adult  Goal: Free from fall injury  Outcome: Progressing  Flowsheets (Taken 10/6/2023 1901)  Free From Fall Injury: Instruct family/caregiver on patient safety

## 2023-10-08 VITALS
HEIGHT: 62 IN | TEMPERATURE: 97.5 F | SYSTOLIC BLOOD PRESSURE: 153 MMHG | WEIGHT: 214 LBS | BODY MASS INDEX: 39.38 KG/M2 | RESPIRATION RATE: 20 BRPM | HEART RATE: 81 BPM | DIASTOLIC BLOOD PRESSURE: 95 MMHG | OXYGEN SATURATION: 94 %

## 2023-10-08 LAB
ANION GAP SERPL CALC-SCNC: 5 MMOL/L (ref 2–11)
BUN SERPL-MCNC: 23 MG/DL (ref 6–23)
CALCIUM SERPL-MCNC: 8.7 MG/DL (ref 8.3–10.4)
CHLORIDE SERPL-SCNC: 107 MMOL/L (ref 101–110)
CO2 SERPL-SCNC: 23 MMOL/L (ref 21–32)
CREAT SERPL-MCNC: 0.6 MG/DL (ref 0.6–1)
ERYTHROCYTE [DISTWIDTH] IN BLOOD BY AUTOMATED COUNT: 16.3 % (ref 11.9–14.6)
EST. AVERAGE GLUCOSE BLD GHB EST-MCNC: 128 MG/DL
GLUCOSE BLD STRIP.AUTO-MCNC: 175 MG/DL (ref 65–100)
GLUCOSE SERPL-MCNC: 193 MG/DL (ref 65–100)
HBA1C MFR BLD: 6.1 % (ref 4.8–5.6)
HCT VFR BLD AUTO: 32.7 % (ref 35.8–46.3)
HGB BLD-MCNC: 10.6 G/DL (ref 11.7–15.4)
MCH RBC QN AUTO: 30.3 PG (ref 26.1–32.9)
MCHC RBC AUTO-ENTMCNC: 32.4 G/DL (ref 31.4–35)
MCV RBC AUTO: 93.4 FL (ref 82–102)
NRBC # BLD: 0 K/UL (ref 0–0.2)
PLATELET # BLD AUTO: 237 K/UL (ref 150–450)
PMV BLD AUTO: 10 FL (ref 9.4–12.3)
POTASSIUM SERPL-SCNC: 4 MMOL/L (ref 3.5–5.1)
RBC # BLD AUTO: 3.5 M/UL (ref 4.05–5.2)
SERVICE CMNT-IMP: ABNORMAL
SODIUM SERPL-SCNC: 135 MMOL/L (ref 133–143)
WBC # BLD AUTO: 22.3 K/UL (ref 4.3–11.1)

## 2023-10-08 PROCEDURE — 36415 COLL VENOUS BLD VENIPUNCTURE: CPT

## 2023-10-08 PROCEDURE — 6370000000 HC RX 637 (ALT 250 FOR IP): Performed by: HOSPITALIST

## 2023-10-08 PROCEDURE — 6360000002 HC RX W HCPCS: Performed by: INTERNAL MEDICINE

## 2023-10-08 PROCEDURE — 80048 BASIC METABOLIC PNL TOTAL CA: CPT

## 2023-10-08 PROCEDURE — 85027 COMPLETE CBC AUTOMATED: CPT

## 2023-10-08 PROCEDURE — 2580000003 HC RX 258: Performed by: INTERNAL MEDICINE

## 2023-10-08 PROCEDURE — 83036 HEMOGLOBIN GLYCOSYLATED A1C: CPT

## 2023-10-08 PROCEDURE — 94640 AIRWAY INHALATION TREATMENT: CPT

## 2023-10-08 PROCEDURE — 6370000000 HC RX 637 (ALT 250 FOR IP): Performed by: INTERNAL MEDICINE

## 2023-10-08 PROCEDURE — 2580000003 HC RX 258: Performed by: HOSPITALIST

## 2023-10-08 PROCEDURE — 2700000000 HC OXYGEN THERAPY PER DAY

## 2023-10-08 PROCEDURE — 82962 GLUCOSE BLOOD TEST: CPT

## 2023-10-08 RX ORDER — PREDNISONE 20 MG/1
40 TABLET ORAL DAILY
Status: DISCONTINUED | OUTPATIENT
Start: 2023-10-08 | End: 2023-10-08 | Stop reason: HOSPADM

## 2023-10-08 RX ORDER — AZITHROMYCIN 250 MG/1
250 TABLET, FILM COATED ORAL DAILY
Qty: 4 TABLET | Refills: 0 | Status: SHIPPED | OUTPATIENT
Start: 2023-10-09 | End: 2023-10-13

## 2023-10-08 RX ORDER — PREDNISONE 10 MG/1
TABLET ORAL
Qty: 14 TABLET | Refills: 0 | Status: SHIPPED | OUTPATIENT
Start: 2023-10-08

## 2023-10-08 RX ORDER — CERTOLIZUMAB PEGOL 200 MG/ML
400 INJECTION, SOLUTION SUBCUTANEOUS
COMMUNITY

## 2023-10-08 RX ORDER — CEFDINIR 300 MG/1
300 CAPSULE ORAL 2 TIMES DAILY
Qty: 12 CAPSULE | Refills: 0 | Status: SHIPPED | OUTPATIENT
Start: 2023-10-08 | End: 2023-10-14

## 2023-10-08 RX ADMIN — AZITHROMYCIN DIHYDRATE 500 MG: 250 TABLET ORAL at 08:13

## 2023-10-08 RX ADMIN — PANTOPRAZOLE SODIUM 40 MG: 40 TABLET, DELAYED RELEASE ORAL at 05:18

## 2023-10-08 RX ADMIN — TIOTROPIUM BROMIDE INHALATION SPRAY 2 PUFF: 3.12 SPRAY, METERED RESPIRATORY (INHALATION) at 07:34

## 2023-10-08 RX ADMIN — MAGNESIUM GLUCONATE 500 MG ORAL TABLET 400 MG: 500 TABLET ORAL at 08:11

## 2023-10-08 RX ADMIN — SODIUM CHLORIDE, PRESERVATIVE FREE 10 ML: 5 INJECTION INTRAVENOUS at 08:14

## 2023-10-08 RX ADMIN — CEFTRIAXONE 1000 MG: 1 INJECTION, POWDER, FOR SOLUTION INTRAMUSCULAR; INTRAVENOUS at 11:33

## 2023-10-08 RX ADMIN — MELOXICAM 15 MG: 7.5 TABLET ORAL at 08:13

## 2023-10-08 RX ADMIN — MOMETASONE FUROATE AND FORMOTEROL FUMARATE DIHYDRATE 2 PUFF: 200; 5 AEROSOL RESPIRATORY (INHALATION) at 07:34

## 2023-10-08 RX ADMIN — LOSARTAN POTASSIUM 25 MG: 50 TABLET, FILM COATED ORAL at 08:11

## 2023-10-08 RX ADMIN — PREDNISONE 40 MG: 20 TABLET ORAL at 10:20

## 2023-10-08 RX ADMIN — DOCOSANOL: 100 CREAM TOPICAL at 05:19

## 2023-10-08 RX ADMIN — WATER 40 MG: 1 INJECTION INTRAMUSCULAR; INTRAVENOUS; SUBCUTANEOUS at 01:47

## 2023-10-08 RX ADMIN — GUAIFENESIN 1200 MG: 600 TABLET ORAL at 08:14

## 2023-10-08 RX ADMIN — LEVOTHYROXINE SODIUM 25 MCG: 0.05 TABLET ORAL at 05:18

## 2023-10-08 NOTE — DISCHARGE SUMMARY
Patients: [email or web address]     Methodology: Isothermal Nucleic Acid Amplification         Influenza A/B, Molecular [[id number]] Collected: 10/06/23 1408    Order Status: Completed Specimen: Not Specified Updated: 10/06/23 1443     Influenza A, LINDA Not detected        Comment: Negative results do not preclude infection with influenza virus and should not be the sole basis of a patient treatment decision.         Influenza B, LINDA Not detected               All Labs from Last 24 Hrs:  Recent Results (from the past 24 hour(s))   POCT Glucose    Collection Time: 10/07/23 10:36 AM   Result Value Ref Range    POC Glucose 177 (H) 65 - 100 mg/dL    Performed by: [employer]    POCT Glucose    Collection Time: 10/07/23  4:40 PM   Result Value Ref Range    POC Glucose 196 (H) 65 - 100 mg/dL    Performed by: MyrioANNET[facility]    POCT Glucose    Collection Time: 10/07/23  7:07 PM   Result Value Ref Range    POC Glucose 227 (H) 65 - 100 mg/dL    Performed by: [address] [place] [address] PPD TEST IN 24 HRS    Collection Time: 10/07/23  9:24 PM   Result Value Ref Range    PPD, (POC) Negative Negative    mm Induration 0 0 - 5 mm   Basic Metabolic Panel w/ Reflex to MG    Collection Time: 10/08/23  5:28 AM   Result Value Ref Range    Sodium 135 133 - 143 mmol/L    Potassium 4.0 3.5 - 5.1 mmol/L    Chloride 107 101 - 110 mmol/L    CO2 23 21 - 32 mmol/L    Anion Gap 5 2 - 11 mmol/L    Glucose 193 (H) 65 - 100 mg/dL    BUN 23 6 - 23 MG/DL    Creatinine 0.60 0.6 - 1.0 MG/DL    Est, Glom Filt Rate >60 >60 ml/min/1.73m2    Calcium 8.7 8.3 - 10.4 MG/DL   CBC    Collection Time: 10/08/23  5:28 AM   Result Value Ref Range    WBC 22.3 (H) 4.3 - 11.1 K/uL    RBC 3.50 (L) 4.05 - 5.2 M/uL    Hemoglobin 10.6 (L) 11.7 - 15.4 g/dL    Hematocrit 32.7 (L) 35.8 - 46.3 %    MCV 93.4 82 - 102 FL    MCH 30.3 26.1 - 32.9 PG    MCHC 32.4 31.4 - 35.0 g/dL    RDW 16.3 (H) 11.9 - 14.6 %    Platelets 190 408 - 915

## 2023-10-08 NOTE — PROGRESS NOTES
ACUTE PHYSICAL THERAPY GOALS:   (Developed with and agreed upon by patient and/or caregiver. )    LTG:  (1.)Ms. Carina Zimmerman will move from supine to sit and sit to supine , scoot up and down, and roll side to side in bed with INDEPENDENT within 4 treatment day(s). (2.)Ms. Carina Zimmerman will transfer from bed to chair and chair to bed with INDEPENDENT using the least restrictive device within 4 treatment day(s). (3.)Ms. Carina Zimmerman will ambulate with INDEPENDENT for 500 feet with the least restrictive device within 4 treatment day(s). ________________________________________________________________________________________________     PHYSICAL THERAPY Initial Assessment and PM  (Link to Caseload Tracking: PT Visit Days : 1  Acknowledge Orders  Time In/Out  PT Charge Capture  Rehab Caseload Tracker    Smiley Hernandez is a 60 y.o. female   PRIMARY DIAGNOSIS: Sepsis due to pneumonia ([address])  Hypoxia [R09.02]  Pneumonia of left lower lobe due to infectious organism [J18.9]  Acute hypoxemic respiratory failure ([address]) [J96.01]       Reason for Referral: Generalized Muscle Weakness (M62.81)  Other lack of cordination (R27.8)  Difficulty in walking, Not elsewhere classified (R26.2)  Other abnormalities of gait and mobility (R26.89)  Inpatient: Payor: Librado Pfeiffer / Plan: Wallace [facility] / Product Type: *No Product type* /     ASSESSMENT:     REHAB RECOMMENDATIONS:   Recommendation to date pending progress:  Setting:  No further skilled physical therapy after discharge from hospital    Equipment:    To Be Determined     ASSESSMENT:  Ms. Carina Zimmerman is a pleasant middle aged female with above diagnosis who demonstrates with decreased transfers, ambulation and mobility below her prior functional baseline. All transfers are currently limited at MODIFIED IND x 1 sit and stand followed by ambulation for 250 feet with the deficits stated below and good standing balance. Espiridion Double then returns to room and is seated in bedside chair MOD I x 1.
Discharge instructions reviewed with Pt. Opportunity for questions and clarification given. IV removed and cath tip intact. Scripts sent to pharmacy. Education given to pt and pt was able to teach back. No needs at this time and pt waiting on ride to arrive.
Local    Daughter -  Jonathan Herzog    Full code
Pt awake most of the night, did not rest well. Otherwise had uneventful shift.   Remains on 3L NC.
Pt denies needs at this time, NAD. Patient has sat in chair in intervals today, showered. Left AC PIV removed per patient request. Hourly rounds completed. Bed in low and locked position, call light and personal items within reach. Will give bedside report to oncoming nurse.
Pt had uneventful shift. NAD noted. See previous note regarding BP. Pt currently up in chair. All personal items and call light within reach. Pt eager to go home soon.
Pt's family member () states that pt did not receive the 3 boluses ordered. Spoke with Dr. Chris Davis, orders to give one more bolus that was not administered previously.
Pt's home med list updated for statin, antihypertensive med and preferred pharmacy. Dr. Supriya Hand updated that meds added, awaiting new orders.
TRANSFER - IN REPORT:    Verbal report received from [address] on Ricardo Figures  being received from ER for routine progression of patient care      Report consisted of patient's Situation, Background, Assessment and   Recommendations(SBAR). Information from the following report(s) Nurse Handoff Report was reviewed with the receiving nurse. Opportunity for questions and clarification was provided. Assessment completed upon patient's arrival to unit and care assumed.
To: Patient  Education Provided: Role of Therapy;Plan of Care;Home Exercise Program;Precautions; ADL Adaptive Strategies;Transfer Training;Energy Conservation;IADL Safety; Fall Prevention Strategies; Equipment  Education Provided Comments: Importance of OOB and energy conservation tasks  Education Method: Demonstration;Verbal;Teach Back  Barriers to Learning: None  Education Outcome: Verbalized understanding;Demonstrated understanding    TOTAL TREATMENT DURATION AND TIME:  Time In: 1130  Time Out: 1430 [facility]  Minutes: 35    NEYDA JAEGER, OT      Neyda Jaeger MS, OTR/L
mL IntraVENous PRN    magnesium oxide (MAG-OX) tablet 400 mg  400 mg Oral BID    ipratropium 0.5 mg-albuterol 2.5 mg (DUONEB) nebulizer solution 1 Dose  1 Dose Inhalation Q4H PRN    sodium chloride flush 0.9 % injection 5-40 mL  5-40 mL IntraVENous 2 times per day    sodium chloride flush 0.9 % injection 5-40 mL  5-40 mL IntraVENous PRN    0.9 % sodium chloride infusion   IntraVENous PRN    enoxaparin (LOVENOX) injection 40 mg  40 mg SubCUTAneous Q24H    ondansetron (ZOFRAN-ODT) disintegrating tablet 4 mg  4 mg Oral Q8H PRN    Or    ondansetron (ZOFRAN) injection 4 mg  4 mg IntraVENous Q6H PRN    polyethylene glycol (GLYCOLAX) packet 17 g  17 g Oral Daily PRN    acetaminophen (TYLENOL) tablet 650 mg  650 mg Oral Q6H PRN    Or    acetaminophen (TYLENOL) suppository 650 mg  650 mg Rectal Q6H PRN    levothyroxine (SYNTHROID) tablet 25 mcg  25 mcg Oral QAM AC    insulin lispro (HUMALOG) injection vial 0-8 Units  0-8 Units SubCUTAneous TID WC    insulin lispro (HUMALOG) injection vial 0-4 Units  0-4 Units SubCUTAneous Nightly    azithromycin (ZITHROMAX) tablet 500 mg  500 mg Oral Daily    cefTRIAXone (ROCEPHIN) 2,000 mg in sodium chloride 0.9 % 50 mL IVPB (mini-bag)  2,000 mg IntraVENous Q24H    predniSONE (DELTASONE) tablet 20 mg  20 mg Oral Daily    tuberculin injection 5 Units  5 Units IntraDERmal Once    sertraline (ZOLOFT) tablet 25 mg  25 mg Oral Nightly    melatonin tablet 3 mg  3 mg Oral Nightly PRN       Signed:  Hema Acharya MD    Part of this note may have been written by using a voice dictation software. The note has been proof read but may still contain some grammatical/other typographical errors.

## 2023-10-08 NOTE — CARE COORDINATION
Pt chart reviewed for discharge planning. Pt lives with family. She is insured with a PCP identified for follow up care. Pt is for discharge home today with family and no needs/supportive care orders recieved for CM at this time. Pt was seen by PT with no recommendations for additional therapy at this time. 10/08/23 1821   Service Assessment   Patient Orientation Alert and Oriented   Cognition Alert   History Provided By Medical Record   Primary Caregiver Self   Support Systems Spouse/Significant Other   Patient's Healthcare Decision Maker is: Legal Next of Kin   PCP Verified by CM Yes   Last Visit to PCP Within last 6 months   Prior Functional Level Independent in ADLs/IADLs   Current Functional Level Independent in ADLs/IADLs   Can patient return to prior living arrangement Yes   Ability to make needs known: Good   Family able to assist with home care needs: Yes   Would you like for me to discuss the discharge plan with any other family members/significant others, and if so, who? No   Financial Resources Other (Comment)  (BC/BS)   Social/Functional History   Lives With Spouse; Other (comment)  (CG for 4YO special needs grandson)   Type of [facility]; Laundry in basement;1/2 bath on main level;Bed/Bath upstairs  (stairs inside home have HRs)   Home Access Stairs to enter without rails   Entrance Stairs - Number of Steps 1   Bathroom Shower/Tub Tub/Shower unit; Shower chair with back   [facility] chair;Hand-held shower;Grab bars in shower   [address] None;Grab bars   ADL Assistance Independent   Homemaking Assistance Independent   Ambulation Assistance Independent   Transfer Assistance Independent   Active  Yes   Mode of Transportation Car   Occupation Retired   Discharge Planning   Type of [address] [place] [address] Prior To Admission None

## 2023-10-08 NOTE — FLOWSHEET NOTE
10/08/23 0335 10/08/23 0444   Vital Signs   BP (!) 157/109  (RN Aware) (!) 149/110   MAP (Calculated) 125 123     Dr. Wiley Navas notified, no new orders.

## 2024-03-08 ENCOUNTER — HOSPITAL ENCOUNTER (OUTPATIENT)
Dept: GENERAL RADIOLOGY | Age: 59
Discharge: HOME | End: 2024-03-08
Payer: COMMERCIAL

## 2024-03-08 DIAGNOSIS — L40.50 PSORIATIC ARTHRITIS (HCC): ICD-10-CM

## 2024-03-08 PROCEDURE — 73120 X-RAY EXAM OF HAND: CPT

## 2024-03-08 PROCEDURE — 72202 X-RAY EXAM SI JOINTS 3/> VWS: CPT

## 2024-03-08 PROCEDURE — 73620 X-RAY EXAM OF FOOT: CPT

## 2024-07-18 NOTE — PROGRESS NOTES
Smoking status: Former     Current packs/day: 0.00     Types: Cigarettes     Quit date: 2019     Years since quittin.1    Smokeless tobacco: Never   Substance and Sexual Activity    Alcohol use: Never       Family History   Problem Relation Age of Onset    COPD Father     COPD Maternal Grandmother        Allergies   Allergen Reactions    Atorvastatin Diarrhea    Hydrochlorothiazide Anxiety       Current Outpatient Medications   Medication Sig    JARDIANCE 10 MG tablet TAKE 1 TABLET BY MOUTH ONCE DAILY AFTER BREAKFAST    nabumetone (RELAFEN) 500 MG tablet Take 1 tablet by mouth 2 times daily (with meals)    alendronate (FOSAMAX) 70 MG tablet 1 tablet 30 minutes before the first food, beverage or medicine of the day with plain water Orally once a week and remain upright for 30 minutes after taking for 30 days    OZEMPIC, 1 MG/DOSE, 4 MG/3ML SOPN sc injection INJECT 1MG UNDER THE SKIN EVERY 7 DAYS    TREMFYA 100 MG/ML SOPN     traZODone (DESYREL) 50 MG tablet Take 1 tablet by mouth nightly    losartan (COZAAR) 25 MG tablet Take 1 tablet by mouth daily    pantoprazole (PROTONIX) 40 MG tablet Take 1 tablet by mouth daily    rosuvastatin (CRESTOR) 10 MG tablet Take 1 tablet by mouth    sertraline (ZOLOFT) 25 MG tablet Take 1 tablet by mouth nightly    fluticasone-umeclidin-vilant (TRELEGY ELLIPTA) 200-62.5-25 MCG/ACT AEPB inhaler 1 inhalation every morning, rinse mouth after use    OXYGEN 3 lpm HS    albuterol (PROVENTIL) (2.5 MG/3ML) 0.083% nebulizer solution 1 vial via nebulizer 4 times daily as needed for shortness of breath, wheezing. Diagnosis COPD, J 44.9.    albuterol sulfate  (90 Base) MCG/ACT inhaler 2 puffs 4 times daily if needed for shortness of breath or wheezing.    folic acid (FOLVITE) 1 MG tablet Take by mouth daily    levothyroxine (SYNTHROID) 25 MCG tablet Take 1 tablet by mouth every morning (before breakfast)    meloxicam (MOBIC) 15 MG tablet Take 1 tablet by mouth daily

## 2024-07-19 ENCOUNTER — OFFICE VISIT (OUTPATIENT)
Dept: PULMONOLOGY | Age: 59
End: 2024-07-19
Payer: COMMERCIAL

## 2024-07-19 VITALS
TEMPERATURE: 97.3 F | HEIGHT: 61 IN | OXYGEN SATURATION: 94 % | RESPIRATION RATE: 18 BRPM | HEART RATE: 80 BPM | SYSTOLIC BLOOD PRESSURE: 148 MMHG | WEIGHT: 203 LBS | BODY MASS INDEX: 38.33 KG/M2 | DIASTOLIC BLOOD PRESSURE: 90 MMHG

## 2024-07-19 DIAGNOSIS — J30.9 ALLERGIC RHINITIS, UNSPECIFIED SEASONALITY, UNSPECIFIED TRIGGER: Chronic | ICD-10-CM

## 2024-07-19 DIAGNOSIS — L40.50 PSORIASIS WITH ARTHROPATHY (HCC): Chronic | ICD-10-CM

## 2024-07-19 DIAGNOSIS — F17.211 CIGARETTE NICOTINE DEPENDENCE IN REMISSION: Chronic | ICD-10-CM

## 2024-07-19 DIAGNOSIS — J44.9 COPD, SEVERE (HCC): Primary | Chronic | ICD-10-CM

## 2024-07-19 DIAGNOSIS — G47.34 NOCTURNAL HYPOXIA: Chronic | ICD-10-CM

## 2024-07-19 PROCEDURE — 3080F DIAST BP >= 90 MM HG: CPT | Performed by: NURSE PRACTITIONER

## 2024-07-19 PROCEDURE — 3077F SYST BP >= 140 MM HG: CPT | Performed by: NURSE PRACTITIONER

## 2024-07-19 PROCEDURE — 99214 OFFICE O/P EST MOD 30 MIN: CPT | Performed by: NURSE PRACTITIONER

## 2024-07-19 RX ORDER — EMPAGLIFLOZIN 10 MG/1
TABLET, FILM COATED ORAL
COMMUNITY

## 2024-07-19 RX ORDER — SEMAGLUTIDE 1.34 MG/ML
INJECTION, SOLUTION SUBCUTANEOUS
COMMUNITY

## 2024-07-19 RX ORDER — GUSELKUMAB 100 MG/ML
INJECTION SUBCUTANEOUS
COMMUNITY
Start: 2024-06-21

## 2024-07-19 RX ORDER — ALENDRONATE SODIUM 70 MG/1
TABLET ORAL
COMMUNITY
Start: 2024-06-21 | End: 2024-12-17

## 2024-07-19 RX ORDER — TRAZODONE HYDROCHLORIDE 50 MG/1
50 TABLET ORAL NIGHTLY
COMMUNITY

## 2024-07-19 RX ORDER — NABUMETONE 500 MG/1
500 TABLET, FILM COATED ORAL 2 TIMES DAILY WITH MEALS
COMMUNITY
Start: 2024-06-29

## 2024-07-19 ASSESSMENT — ENCOUNTER SYMPTOMS
WHEEZING: 0
COUGH: 0
SPUTUM PRODUCTION: 0
SHORTNESS OF BREATH: 1
HEMOPTYSIS: 0

## 2024-07-19 NOTE — PATIENT INSTRUCTIONS
Continue Trelegy inhaler, 1 inhalation every morning, rinse mouth after use.    Albuterol inhaler or nebulizer 4 times daily if needed for shortness of breath or wheezing.     Continue oxygen with sleep as prescribed.     Use oxygen with exertion if needed to keep saturation > 90%.  She has order for portable concentrator, which she is pursuing purchasing on her own.  Alternatively, can be assessed for portable conserving device (tank) through [facility], but will need order to do so.    Recommend newest COVID booster, flu vaccine in the fall and RSV vaccine.  She is up to date on pneumonia vaccines for now.  Advised to check with rheumatologist in regards to timing of vaccines in relation to her medications.